# Patient Record
Sex: FEMALE | Race: WHITE | Employment: OTHER | URBAN - NONMETROPOLITAN AREA
[De-identification: names, ages, dates, MRNs, and addresses within clinical notes are randomized per-mention and may not be internally consistent; named-entity substitution may affect disease eponyms.]

---

## 2016-05-02 LAB — MAMMOGRAPHY, EXTERNAL: NORMAL

## 2018-04-11 PROBLEM — K50.919 CROHN'S DISEASE WITH COMPLICATION (HCC): Status: ACTIVE | Noted: 2018-02-15

## 2018-09-17 PROBLEM — F41.9 ANXIETY: Status: ACTIVE | Noted: 2018-09-17

## 2019-03-05 PROBLEM — E11.9 TYPE 2 DIABETES MELLITUS WITHOUT COMPLICATION, WITHOUT LONG-TERM CURRENT USE OF INSULIN (HCC): Status: ACTIVE | Noted: 2019-03-05

## 2019-03-05 PROBLEM — K58.2 IRRITABLE BOWEL SYNDROME WITH BOTH CONSTIPATION AND DIARRHEA: Status: ACTIVE | Noted: 2019-03-05

## 2020-05-05 PROBLEM — B00.9 HSV-2 (HERPES SIMPLEX VIRUS 2) INFECTION: Status: ACTIVE | Noted: 2020-05-05

## 2022-10-13 ENCOUNTER — TELEPHONE (OUTPATIENT)
Dept: FAMILY MEDICINE CLINIC | Age: 52
End: 2022-10-13

## 2022-10-13 NOTE — TELEPHONE ENCOUNTER
Patient is wondering about the Semaglutide/Ozempic injection she was prescribed. Patient states that she received a message from the pharmacy that it needed to have a prior authorization done. Patient is wondering if someone is working on this or where we are with the prior authorization.

## 2022-10-13 NOTE — TELEPHONE ENCOUNTER
We did not receive a PA on this medication[facility]

## 2022-10-25 ENCOUNTER — HOSPITAL ENCOUNTER (INPATIENT)
Age: 52
LOS: 3 days | Discharge: HOME | DRG: 386 | End: 2022-10-28
Attending: EMERGENCY MEDICINE | Admitting: SURGERY
Payer: COMMERCIAL

## 2022-10-25 ENCOUNTER — APPOINTMENT (OUTPATIENT)
Dept: GENERAL RADIOLOGY | Age: 52
DRG: 386 | End: 2022-10-25
Payer: COMMERCIAL

## 2022-10-25 ENCOUNTER — APPOINTMENT (OUTPATIENT)
Dept: CT IMAGING | Age: 52
DRG: 386 | End: 2022-10-25
Payer: COMMERCIAL

## 2022-10-25 DIAGNOSIS — K56.609 SMALL BOWEL OBSTRUCTION (HCC): Primary | ICD-10-CM

## 2022-10-25 LAB
ALBUMIN SERPL-MCNC: 3.9 GM/DL (ref 3.4–5)
ALP BLD-CCNC: 134 U/L (ref 46–116)
ALT SERPL-CCNC: 32 U/L (ref 14–63)
ANION GAP: 12 MEQ/L (ref 8–16)
AST SERPL-CCNC: 11 U/L (ref 15–37)
BASOPHILS # BLD: 0.1 % (ref 0–3)
BASOPHILS ABSOLUTE: 0 THOU/MM3 (ref 0–0.1)
BILIRUB SERPL-MCNC: 0.6 MG/DL (ref 0.2–1)
BILIRUBIN URINE: NEGATIVE
BLOOD, URINE: NEGATIVE
BUN BLDV-MCNC: 17 MG/DL (ref 7–18)
CHARACTER, URINE: CLEAR
CHLORIDE BLD-SCNC: 98 MEQ/L (ref 98–107)
CO2: 26 MEQ/L (ref 21–32)
COLOR: YELLOW
CREAT SERPL-MCNC: 1.1 MG/DL (ref 0.6–1.3)
EOSINOPHILS ABSOLUTE: 0.1 THOU/MM3 (ref 0–0.5)
EOSINOPHILS RELATIVE PERCENT: 0.4 % (ref 0–4)
GFR, ESTIMATED: > 60 ML/MIN/1.73M2
GLUCOSE BLD-MCNC: 149 MG/DL (ref 70–108)
GLUCOSE BLD-MCNC: 187 MG/DL (ref 70–108)
GLUCOSE BLD-MCNC: 267 MG/DL (ref 74–106)
GLUCOSE URINE: 500 MG/DL
HCT VFR BLD CALC: 45.6 % (ref 37–47)
HEMOGLOBIN: 15.2 GM/DL (ref 12–16)
IMMATURE GRANS (ABS): 0.05 THOU/MM3 (ref 0–0.07)
IMMATURE GRANULOCYTES: 0 %
KETONES, URINE: NEGATIVE
LACTATE: 1.6 MMOL/L (ref 0.9–1.7)
LEUKOCYTE ESTERASE, URINE: NEGATIVE
LIPASE: 123 U/L (ref 73–393)
LYMPHOCYTES # BLD: 8.2 % (ref 15–47)
LYMPHOCYTES ABSOLUTE: 1.5 THOU/MM3 (ref 1–4.8)
MCH RBC QN AUTO: 28 PG (ref 26–32)
MCHC RBC AUTO-ENTMCNC: 33.3 GM/DL (ref 31–35)
MCV RBC AUTO: 84.1 FL (ref 81–99)
MONOCYTES: 0.8 THOU/MM3 (ref 0.3–1.3)
MONOCYTES: 4.1 % (ref 0–12)
NITRITE, URINE: NEGATIVE
PDW BLD-RTO: 12.7 % (ref 11.5–14.9)
PH UA: 5 (ref 5–9)
PLATELET # BLD: 358 THOU/MM3 (ref 130–400)
PMV BLD AUTO: 9.5 FL (ref 9.4–12.4)
POC CALCIUM: 10 MG/DL (ref 8.5–10.1)
POTASSIUM SERPL-SCNC: 4.4 MEQ/L (ref 3.5–5.1)
PROTEIN UA: NEGATIVE
RBC # BLD: 5.42 MILL/MM3 (ref 4.1–5.3)
SEG NEUTROPHILS: 86.9 % (ref 43–75)
SEGMENTED NEUTROPHILS ABSOLUTE COUNT: 16.3 THOU/MM3 (ref 1.8–7.7)
SODIUM BLD-SCNC: 136 MEQ/L (ref 136–145)
SPECIFIC GRAVITY, URINE: > 1.03 (ref 1–1.03)
TOTAL PROTEIN: 6.8 G/DL (ref 6.1–8)
UROBILINOGEN, URINE: 0.2 EU/DL (ref 0–1)
WBC # BLD: 18.8 THOU/MM3 (ref 4.8–10.8)

## 2022-10-25 PROCEDURE — 74177 CT ABD & PELVIS W/CONTRAST: CPT

## 2022-10-25 PROCEDURE — 82247 BILIRUBIN TOTAL: CPT

## 2022-10-25 PROCEDURE — 81003 URINALYSIS AUTO W/O SCOPE: CPT

## 2022-10-25 PROCEDURE — 6360000002 HC RX W HCPCS: Performed by: EMERGENCY MEDICINE

## 2022-10-25 PROCEDURE — 99222 1ST HOSP IP/OBS MODERATE 55: CPT | Performed by: SURGERY

## 2022-10-25 PROCEDURE — 96375 TX/PRO/DX INJ NEW DRUG ADDON: CPT

## 2022-10-25 PROCEDURE — 6370000000 HC RX 637 (ALT 250 FOR IP): Performed by: SURGERY

## 2022-10-25 PROCEDURE — 84450 TRANSFERASE (AST) (SGOT): CPT

## 2022-10-25 PROCEDURE — 2580000003 HC RX 258: Performed by: EMERGENCY MEDICINE

## 2022-10-25 PROCEDURE — 74018 RADEX ABDOMEN 1 VIEW: CPT

## 2022-10-25 PROCEDURE — 84155 ASSAY OF PROTEIN SERUM: CPT

## 2022-10-25 PROCEDURE — 82565 ASSAY OF CREATININE: CPT

## 2022-10-25 PROCEDURE — 96374 THER/PROPH/DIAG INJ IV PUSH: CPT

## 2022-10-25 PROCEDURE — 82948 REAGENT STRIP/BLOOD GLUCOSE: CPT

## 2022-10-25 PROCEDURE — 6360000004 HC RX CONTRAST MEDICATION: Performed by: EMERGENCY MEDICINE

## 2022-10-25 PROCEDURE — 2580000003 HC RX 258: Performed by: SURGERY

## 2022-10-25 PROCEDURE — 82435 ASSAY OF BLOOD CHLORIDE: CPT

## 2022-10-25 PROCEDURE — 84295 ASSAY OF SERUM SODIUM: CPT

## 2022-10-25 PROCEDURE — 84132 ASSAY OF SERUM POTASSIUM: CPT

## 2022-10-25 PROCEDURE — 82374 ASSAY BLOOD CARBON DIOXIDE: CPT

## 2022-10-25 PROCEDURE — 83605 ASSAY OF LACTIC ACID: CPT

## 2022-10-25 PROCEDURE — 1200000000 HC SEMI PRIVATE

## 2022-10-25 PROCEDURE — 84460 ALANINE AMINO (ALT) (SGPT): CPT

## 2022-10-25 PROCEDURE — 82947 ASSAY GLUCOSE BLOOD QUANT: CPT

## 2022-10-25 PROCEDURE — 84520 ASSAY OF UREA NITROGEN: CPT

## 2022-10-25 PROCEDURE — 84075 ASSAY ALKALINE PHOSPHATASE: CPT

## 2022-10-25 PROCEDURE — 83690 ASSAY OF LIPASE: CPT

## 2022-10-25 PROCEDURE — 6360000002 HC RX W HCPCS: Performed by: SURGERY

## 2022-10-25 PROCEDURE — 85025 COMPLETE CBC W/AUTO DIFF WBC: CPT

## 2022-10-25 PROCEDURE — 82310 ASSAY OF CALCIUM: CPT

## 2022-10-25 PROCEDURE — 99285 EMERGENCY DEPT VISIT HI MDM: CPT

## 2022-10-25 PROCEDURE — 82040 ASSAY OF SERUM ALBUMIN: CPT

## 2022-10-25 PROCEDURE — 0D9670Z DRAINAGE OF STOMACH WITH DRAINAGE DEVICE, VIA NATURAL OR ARTIFICIAL OPENING: ICD-10-PCS | Performed by: EMERGENCY MEDICINE

## 2022-10-25 RX ORDER — METOPROLOL TARTRATE 50 MG/1
50 TABLET, FILM COATED ORAL 2 TIMES DAILY
Status: DISCONTINUED | OUTPATIENT
Start: 2022-10-25 | End: 2022-10-28 | Stop reason: HOSPADM

## 2022-10-25 RX ORDER — INSULIN LISPRO 100 [IU]/ML
0-8 INJECTION, SOLUTION INTRAVENOUS; SUBCUTANEOUS EVERY 4 HOURS
Status: DISCONTINUED | OUTPATIENT
Start: 2022-10-25 | End: 2022-10-26

## 2022-10-25 RX ORDER — LOSARTAN POTASSIUM 100 MG/1
100 TABLET ORAL DAILY
Status: DISCONTINUED | OUTPATIENT
Start: 2022-10-25 | End: 2022-10-28 | Stop reason: HOSPADM

## 2022-10-25 RX ORDER — SODIUM CHLORIDE 9 MG/ML
INJECTION, SOLUTION INTRAVENOUS CONTINUOUS
Status: DISCONTINUED | OUTPATIENT
Start: 2022-10-25 | End: 2022-10-27

## 2022-10-25 RX ORDER — TRAZODONE HYDROCHLORIDE 100 MG/1
100 TABLET ORAL NIGHTLY PRN
Status: DISCONTINUED | OUTPATIENT
Start: 2022-10-25 | End: 2022-10-28 | Stop reason: HOSPADM

## 2022-10-25 RX ORDER — SODIUM CHLORIDE 0.9 % (FLUSH) 0.9 %
5-40 SYRINGE (ML) INJECTION PRN
Status: DISCONTINUED | OUTPATIENT
Start: 2022-10-25 | End: 2022-10-28 | Stop reason: HOSPADM

## 2022-10-25 RX ORDER — AMLODIPINE BESYLATE 10 MG/1
10 TABLET ORAL DAILY
Status: DISCONTINUED | OUTPATIENT
Start: 2022-10-25 | End: 2022-10-28 | Stop reason: HOSPADM

## 2022-10-25 RX ORDER — SODIUM CHLORIDE 9 MG/ML
INJECTION, SOLUTION INTRAVENOUS PRN
Status: DISCONTINUED | OUTPATIENT
Start: 2022-10-25 | End: 2022-10-28 | Stop reason: HOSPADM

## 2022-10-25 RX ORDER — ONDANSETRON 2 MG/ML
4 INJECTION INTRAMUSCULAR; INTRAVENOUS ONCE
Status: COMPLETED | OUTPATIENT
Start: 2022-10-25 | End: 2022-10-25

## 2022-10-25 RX ORDER — ACETAMINOPHEN 325 MG/1
650 TABLET ORAL EVERY 6 HOURS PRN
Status: DISCONTINUED | OUTPATIENT
Start: 2022-10-25 | End: 2022-10-28 | Stop reason: HOSPADM

## 2022-10-25 RX ORDER — DEXTROSE MONOHYDRATE 100 MG/ML
INJECTION, SOLUTION INTRAVENOUS CONTINUOUS PRN
Status: DISCONTINUED | OUTPATIENT
Start: 2022-10-25 | End: 2022-10-28 | Stop reason: HOSPADM

## 2022-10-25 RX ORDER — SODIUM CHLORIDE 0.9 % (FLUSH) 0.9 %
5-40 SYRINGE (ML) INJECTION EVERY 12 HOURS SCHEDULED
Status: DISCONTINUED | OUTPATIENT
Start: 2022-10-25 | End: 2022-10-28 | Stop reason: HOSPADM

## 2022-10-25 RX ORDER — ENOXAPARIN SODIUM 100 MG/ML
40 INJECTION SUBCUTANEOUS EVERY 24 HOURS
Status: DISCONTINUED | OUTPATIENT
Start: 2022-10-25 | End: 2022-10-28 | Stop reason: HOSPADM

## 2022-10-25 RX ORDER — 0.9 % SODIUM CHLORIDE 0.9 %
1000 INTRAVENOUS SOLUTION INTRAVENOUS ONCE
Status: COMPLETED | OUTPATIENT
Start: 2022-10-25 | End: 2022-10-25

## 2022-10-25 RX ORDER — ONDANSETRON 2 MG/ML
4 INJECTION INTRAMUSCULAR; INTRAVENOUS EVERY 6 HOURS PRN
Status: DISCONTINUED | OUTPATIENT
Start: 2022-10-25 | End: 2022-10-28 | Stop reason: HOSPADM

## 2022-10-25 RX ADMIN — ACETAMINOPHEN 650 MG: 325 TABLET ORAL at 23:52

## 2022-10-25 RX ADMIN — TRAZODONE HYDROCHLORIDE 100 MG: 100 TABLET ORAL at 23:51

## 2022-10-25 RX ADMIN — HYDROMORPHONE HYDROCHLORIDE 0.5 MG: 1 INJECTION, SOLUTION INTRAMUSCULAR; INTRAVENOUS; SUBCUTANEOUS at 14:12

## 2022-10-25 RX ADMIN — HYDROMORPHONE HYDROCHLORIDE 0.5 MG: 1 INJECTION, SOLUTION INTRAMUSCULAR; INTRAVENOUS; SUBCUTANEOUS at 10:20

## 2022-10-25 RX ADMIN — ONDANSETRON 4 MG: 2 INJECTION INTRAMUSCULAR; INTRAVENOUS at 10:21

## 2022-10-25 RX ADMIN — ONDANSETRON 4 MG: 2 INJECTION INTRAMUSCULAR; INTRAVENOUS at 17:01

## 2022-10-25 RX ADMIN — SODIUM CHLORIDE 1000 ML: 9 INJECTION, SOLUTION INTRAVENOUS at 10:02

## 2022-10-25 RX ADMIN — HYDROMORPHONE HYDROCHLORIDE 1 MG: 1 INJECTION, SOLUTION INTRAMUSCULAR; INTRAVENOUS; SUBCUTANEOUS at 19:21

## 2022-10-25 RX ADMIN — SODIUM CHLORIDE 1000 ML: 9 INJECTION, SOLUTION INTRAVENOUS at 12:21

## 2022-10-25 RX ADMIN — METOPROLOL TARTRATE 50 MG: 50 TABLET, FILM COATED ORAL at 19:29

## 2022-10-25 RX ADMIN — SODIUM CHLORIDE: 9 INJECTION, SOLUTION INTRAVENOUS at 18:26

## 2022-10-25 RX ADMIN — IOPAMIDOL 100 ML: 755 INJECTION, SOLUTION INTRAVENOUS at 10:39

## 2022-10-25 RX ADMIN — ENOXAPARIN SODIUM 40 MG: 100 INJECTION SUBCUTANEOUS at 21:16

## 2022-10-25 ASSESSMENT — PAIN - FUNCTIONAL ASSESSMENT
PAIN_FUNCTIONAL_ASSESSMENT: ACTIVITIES ARE NOT PREVENTED
PAIN_FUNCTIONAL_ASSESSMENT: ACTIVITIES ARE NOT PREVENTED

## 2022-10-25 ASSESSMENT — ENCOUNTER SYMPTOMS
SORE THROAT: 0
VOMITING: 1
NAUSEA: 1
ABDOMINAL PAIN: 1
EYE PAIN: 0
SHORTNESS OF BREATH: 0
DIARRHEA: 0
WHEEZING: 0
BLOOD IN STOOL: 0
EYE DISCHARGE: 0

## 2022-10-25 ASSESSMENT — PAIN SCALES - GENERAL
PAINLEVEL_OUTOF10: 8
PAINLEVEL_OUTOF10: 3
PAINLEVEL_OUTOF10: 6
PAINLEVEL_OUTOF10: 7
PAINLEVEL_OUTOF10: 5
PAINLEVEL_OUTOF10: 10

## 2022-10-25 ASSESSMENT — PAIN DESCRIPTION - LOCATION
LOCATION: ABDOMEN
LOCATION: ABDOMEN;HEAD
LOCATION: ABDOMEN

## 2022-10-25 ASSESSMENT — PAIN DESCRIPTION - DESCRIPTORS
DESCRIPTORS: PRESSURE
DESCRIPTORS: PRESSURE

## 2022-10-25 ASSESSMENT — PAIN DESCRIPTION - PAIN TYPE: TYPE: ACUTE PAIN

## 2022-10-25 ASSESSMENT — PAIN DESCRIPTION - ORIENTATION
ORIENTATION: MID;LEFT
ORIENTATION: MID;LEFT

## 2022-10-25 NOTE — ED PROVIDER NOTES
[address]  [address]  Phone: [phone]    eMERGENCY dEPARTMENT eNCOUnter           [address] [facility]       Chief Complaint   Patient presents with    Abdominal Pain    Emesis    Fever    Nausea       Nurses Notes reviewed and I agree except as noted in the HPI. HISTORY OF PRESENT ILLNESS    Leighton Odom is a 47 y.o. female who presented via private vehicle with above-mentioned complaints. Symptoms started 2 days ago. She describes her pain as severe, dull, constant left upper quadrant and epigastric pain which did not radiate anywhere and was not severe or exacerbated by anything. She stated that she vomited twice, her emesis smelled like feces. She had 1 normal bowel movement. She has subjective fever but did not check her temperature. She has history of Crohn's disease but never had pain like that before. She had appendectomy and cholecystectomy as well. Denies recent alcohol consumption. REVIEW OF SYSTEMS     Review of Systems   Constitutional:  Negative for chills and fever. HENT:  Negative for sore throat. Eyes:  Negative for pain and discharge. Respiratory:  Negative for shortness of breath and wheezing. Cardiovascular:  Negative for chest pain and palpitations. Gastrointestinal:  Positive for abdominal pain, nausea and vomiting. Negative for blood in stool and diarrhea. Genitourinary:  Negative for dysuria and hematuria. Musculoskeletal:  Negative for neck pain and neck stiffness. Neurological:  Negative for seizures, syncope and headaches. Psychiatric/Behavioral:  Negative for confusion. PAST MEDICAL HISTORY    has a past medical history of Anxiety, Crohn's disease ([address] Depression, Diverticulosis, Histoplasmosis, Hypertension, Irritable bowel syndrome with both constipation and diarrhea, and Type 2 diabetes mellitus without complication, without long-term current use of insulin ([address]     SURGICAL HISTORY has a past surgical history that includes Carpal tunnel release (Right);  section; Breast reduction surgery; Foot surgery (Left); Appendectomy; Cholecystectomy; and HYSTERECTOMY VAGINAL (). CURRENT MEDICATIONS       Previous Medications    ALBUTEROL SULFATE HFA (PROVENTIL HFA) 108 (90 BASE) MCG/ACT INHALER    Inhale 2 puffs into the lungs every 6 hours as needed for Wheezing    AMLODIPINE (NORVASC) 10 MG TABLET    Take 1 tablet by mouth daily    BLOOD GLUCOSE MONITORING SUPPL ([employer] BLOOD GLUCOSE STARTER) W/DEVICE KIT    Please give whatever brand device and strips covered by insurance    LOSARTAN (COZAAR) 100 MG TABLET    Take 1 tablet by mouth daily    METFORMIN (GLUCOPHAGE-XR) 500 MG EXTENDED RELEASE TABLET    Take 2 tablets by mouth daily (with breakfast)    METOPROLOL TARTRATE (LOPRESSOR) 50 MG TABLET    Take 1 tablet by mouth 2 times daily    ONDANSETRON (ZOFRAN ODT) 4 MG DISINTEGRATING TABLET    Take 1 tablet by mouth every 8 hours as needed for Nausea or Vomiting    TRAZODONE (DESYREL) 100 MG TABLET    Take 1 tablet by mouth nightly as needed for Sleep    VENLAFAXINE (EFFEXOR XR) 37.5 MG EXTENDED RELEASE CAPSULE    Take 3 capsules by mouth daily       ALLERGIES     has No Known Allergies. FAMILY HISTORY     She indicated that her mother is alive. She indicated that her father is . She indicated that the status of her sister is unknown. She indicated that her maternal grandmother is . She indicated that her maternal grandfather is . She indicated that her paternal grandmother is . She indicated that her paternal grandfather is . family history includes Colon Cancer in her maternal grandfather; Early Death in her father; High Blood Pressure in her mother and sister; Other in her mother. SOCIAL HISTORY      reports that she has never smoked.  She has never used smokeless tobacco. She reports that she does not drink alcohol and does not use drugs.    PHYSICAL EXAM     INITIAL VITALS:  height is 5' 6\" (1.676 m) and weight is 194 lb (88 kg). Her temporal temperature is 96.6 °F (35.9 °C) (abnormal). Her blood pressure is 127/79 and her pulse is 92. Her respiration is 16 and oxygen saturation is 93%. Physical Exam  Vitals and nursing note reviewed. Constitutional:       General: She is in acute distress. Appearance: She is well-developed. Comments: Patient is awake and alert, she looks mildly ill but nontoxic   HENT:      Head: Normocephalic and atraumatic. Right Ear: Tympanic membrane normal. No drainage. Left Ear: Tympanic membrane normal. No drainage. Nose: No rhinorrhea. Mouth/Throat:      Pharynx: No oropharyngeal exudate. Eyes:      General: No scleral icterus. Conjunctiva/sclera: Conjunctivae normal.      Pupils: Pupils are equal, round, and reactive to light. Neck:      Thyroid: No thyromegaly. Cardiovascular:      Rate and Rhythm: Normal rate and regular rhythm. Heart sounds: Normal heart sounds. No murmur heard. Pulmonary:      Effort: Pulmonary effort is normal. No respiratory distress. Breath sounds: Normal breath sounds. No stridor. Abdominal:      General: Bowel sounds are normal. There is no distension. Palpations: Abdomen is soft. There is no mass. Tenderness: There is abdominal tenderness. There is guarding. There is no rebound. Comments: There is mild tenderness to palpation of the left upper quadrant and epigastric area with mild guarding. Musculoskeletal:         General: No tenderness. Right shoulder: No swelling or deformity. Cervical back: Normal range of motion and neck supple. Lymphadenopathy:      Cervical: No cervical adenopathy. Skin:     General: Skin is warm and dry. Findings: No rash. Nails: There is no clubbing. Neurological:      Mental Status: She is alert.          DIFFERENTIAL DIAGNOSIS:       DIAGNOSTIC RESULTS RADIOLOGY:   Abdomen and pelvis CT scan with IV contrast showed small bowel obstruction. LABS:   Labs Reviewed   CBC WITH AUTO DIFFERENTIAL - Abnormal; Notable for the following components:       Result Value    WBC 18.8 (*)     RBC 5.42 (*)     Seg Neutrophils 86.9 (*)     Segs Absolute 16.3 (*)     Lymphocytes 8.2 (*)     All other components within normal limits   COMPREHENSIVE METABOLIC PANEL - Abnormal; Notable for the following components:    Glucose 267 (*)     AST 11 (*)     Alkaline Phosphatase 134 (*)     All other components within normal limits   LIPASE   LACTIC ACID   GLOMERULAR FILTRATION RATE, ESTIMATED   ANION GAP   URINALYSIS WITH REFLEX TO CULTURE   PROTEIN, TOTAL    Narrative:     [address] [id number]   Laboratory studies showed leukocytosis    EMERGENCY DEPARTMENT COURSE:   Vitals:    Vitals:    10/25/22 1120 10/25/22 1135 10/25/22 1150 10/25/22 1205   BP: 124/81 127/84 125/87 127/79   Pulse:       Resp:       Temp:       TempSrc:       SpO2: 94% 93%     Weight:       Height:         MDM:  Patient presented with 2-day history of abdominal pain and vomiting. Her blood work showed leukocytosis. CT scan of abdomen pelvis with IV contrast which was reviewed by me showed small bowel obstruction. NG tube was inserted and suctioned about 150 mL of bile. Patient received IV normal saline, Zofran and Dilaudid. KUB confirmed proper placement of NG tube. Reevaluation at 12:10 PM:  She is awake and alert, she seemed slightly more comfortable, she reported improvement. She remained medically stable. Consult:  I spoke with surgeon on-call Dr. Alex Moffett who accept the patient. FINAL IMPRESSION      1.  Small bowel obstruction (HCC)          DISPOSITION/PLAN   Admitted, condition is fair      (Please note that portions of this note were completed with a voice recognition program.  Efforts were made to edit the dictations but occasionally words are mis-transcribed.)    Geneva Hanson MD            Ifeanyi Yamilex Ledbetter MD  10/25/22 3942

## 2022-10-25 NOTE — ED NOTES
EMS notified  of transport to [facility].       Duarte Foster RN  10/25/22 4837

## 2022-10-25 NOTE — ED TRIAGE NOTES
Pt. Presents ambulatory to ED with c/o vomiting, abdominal pain since Sunday; c/o fever and chills last night.

## 2022-10-25 NOTE — PROGRESS NOTES
Patient arrived to [facility] by squad, states pain is 6/10 and tolerable. Slight nausea. Voided 300 ml of yellow urine, sample obtained. 0.9NS infusing into RAC. Oriented to room , call light within reach.

## 2022-10-25 NOTE — ED NOTES
Pt denies any nausea, NG to Low continous suction, approx. . 200 ml. In suction canister.       Abel Pena, RN  10/25/22 1200

## 2022-10-25 NOTE — ED NOTES
Returned from CT scanning, continues to c/o nausea. Unable to urinate at this time.       Ivonne Jackson RN  10/25/22 8088

## 2022-10-25 NOTE — H&P
General Surgery History and Physical  Yamilex Francis DO    Pt Name: Madison Adamson  MRN: [id number]  YOB: 1970  Date of evaluation: 10/25/2022  Primary Care Physician: MAXWELL Velazquez - CNP  Patient evaluated at the request of ED physician  Reason for evaluation: SBO  IMPRESSIONS:   SBO either related to adhesions or Crohn's stricture  Crohn's disease - not currently on therapy  Hx HTN  Type 2 diabetes   does not have any pertinent problems on file. PLANS:   Admit type: Inpatient  It is expected this patient's LOS will be: Greater than 2 midnights  Anticipated Disposition Upon Discharge: Home  Analgesics and antiemetics on a prn basis  IV hydration  NG to LIS  Ice chips and sips of H20  CRP  Am labs and films   Home Medications as ordered  Further recommendations to follow  SUBJECTIVE:   History of Chief Complaint:    Edwardo Diaz is a 47 y. o.female who presents with abdominal pain. The pain is described as cramping and sharp, and is moderate to severe in intensity. Pain is located in the LUQ without radiation. Onset was 2 days ago. Symptoms have been gradually worsening since. Aggravating factors include eating. Alleviating factors include NG decompression. Associated symptoms include nausea and vomiting. She denies constipation, diarrhea, hematochezia, and melena. She admits to history of Crohn's disease diagnosed in childhood but is not any current therapy. She denies history of hepatitis, pancreatitis, jaundice, colitis, and ulcer disease. Past Medical History   has a past medical history of Anxiety, Crohn's disease ([address] Depression, Diverticulosis, Histoplasmosis, Hypertension, Irritable bowel syndrome with both constipation and diarrhea, and Type 2 diabetes mellitus without complication, without long-term current use of insulin ([address] Past Surgical History   has a past surgical history that includes Carpal tunnel release (Right);  section; Breast reduction surgery;  Foot surgery (Bilateral); Appendectomy; Cholecystectomy; and HYSTERECTOMY VAGINAL (2004). Medications  Prior to Admission medications    Medication Sig Start Date End Date Taking? Authorizing Provider   metoprolol tartrate (LOPRESSOR) 50 MG tablet Take 1 tablet by mouth 2 times daily 9/21/22 12/20/22  MAXWELL Velazquez CNP   venlafaxine (EFFEXOR XR) 37.5 MG extended release capsule Take 3 capsules by mouth daily 9/21/22 12/20/22  MAXWELL Velazquez CNP   traZODone (DESYREL) 100 MG tablet Take 1 tablet by mouth nightly as needed for Sleep 9/21/22   MAXWELL Velazquez CNP   albuterol sulfate HFA (PROVENTIL HFA) 108 (90 Base) MCG/ACT inhaler Inhale 2 puffs into the lungs every 6 hours as needed for Wheezing 8/4/22   MAXWELL Velazquez CNP   ondansetron (ZOFRAN ODT) 4 MG disintegrating tablet Take 1 tablet by mouth every 8 hours as needed for Nausea or Vomiting 7/21/22   MAXWELL Velazquez CNP   metFORMIN (GLUCOPHAGE-XR) 500 MG extended release tablet Take 2 tablets by mouth daily (with breakfast)  Patient taking differently: Take 1,500 mg by mouth daily (with breakfast) 11/23/21 9/21/22  MAXWELL Velazquez CNP   amLODIPine (NORVASC) 10 MG tablet Take 1 tablet by mouth daily 7/14/21 10/25/22  MAXWELL Velazquez CNP   losartan (COZAAR) 100 MG tablet Take 1 tablet by mouth daily 7/14/21 10/25/22  MAXWELL Velazquez CNP   Blood Glucose Monitoring Suppl [facility] BLOOD GLUCOSE STARTER) w/Device KIT Please give whatever brand device and strips covered by insurance 4/11/18   MAXWELL Velazquez CNP    Scheduled Meds:  Continuous Infusions:  PRN Meds:. Allergies  has No Known Allergies. Family History  family history includes Colon Cancer in her maternal grandfather; Early Death in her father; High Blood Pressure in her mother and sister; Other in her mother. Social History   reports that she has never smoked.  She has never used smokeless tobacco. She reports that she does not drink alcohol and does not use drugs. Review of Systems:  Unless otherwise stated in HPI. ROS unless stated previously has been otherwise reviewed and are negative. OBJECTIVE:   CURRENT VITALS:  height is 5' 6\" (1.676 m) and weight is 198 lb (89.8 kg). Her oral temperature is 97.9 °F (36.6 °C). Her blood pressure is 139/89 and her pulse is 108 (abnormal). Her respiration is 18 and oxygen saturation is 93%. Body mass index is 31.96 kg/m². Temperature Range (24h):Temp: 97.9 °F (36.6 °C) Temp  Av.3 °F (36.3 °C)  Min: 96.6 °F (35.9 °C)  Max: 97.9 °F (36.6 °C)  BP Range (12X): Systolic (21UDD), BPL:651 , Min:117 , OLP:747     Diastolic (29BEZ), BDD:09, Min:76, Max:89    Pulse Range (24h): Pulse  Av.7  Min: 92  Max: 111  Respiration Range (24h): Resp  Av.3  Min: 16  Max: 18  Current Pulse Ox (24h):  SpO2: 93 %  Pulse Ox Range (24h):  SpO2  Av.7 %  Min: 90 %  Max: 96 %  Oxygen Amount and Delivery:    CONSTITUTIONAL: Alert and oriented times 3, no acute distress and cooperative to examination with proper mood and affect. SKIN: Skin color, texture, turgor normal. No rashes or lesions. LYMPH: no cervical nodes, no inguinal nodes  HEENT: Head is normocephalic, atraumatic. EOMI, PERRLA. NECK: Supple, symmetrical, trachea midline, no adenopathy, thyroid symmetric, not enlarged and no tenderness, skin normal.  CHEST/LUNGS: chest symmetric with normal A/P diameter, normal respiratory rate and rhythm, lungs clear to auscultation without wheezes, rales or rhonchi. No accessory muscle use. Scars None   CARDIOVASCULAR: Heart sounds are normal.  Regular rate and rhythm without murmur, gallop or rub. Normal S1 and S2. Carotid and femoral pulses 2+/4 and equal bilaterally. ABDOMEN: distended surgical scar(s) present. Abnormal bowel sounds: high pitched bowel sounds. Soft, no masses palpable. no evidence of hernia. Percussion: Normal without hepatosplenomegally. Tenderness: LUQ.   RECTAL: deferred, not clinically indicated  NEUROLOGIC: There are no focalizing motor or sensory deficits. CN II-XII are grossly intact. Juan Parisian EXTREMITIES: no cyanosis, no clubbing, and no edema. LABS:     Recent Labs     10/25/22  1007   WBC 18.8*   HGB 15.2   HCT 45.6         K 4.4   CL 98   CO2 26   BUN 17   CREATININE 1.1   AST 11*   ALT 32   BILITOT 0.6   LIPASE 123.0     RADIOLOGY:   I have personally reviewed the following films:  XR ABDOMEN (KUB) (SINGLE AP VIEW)   Final Result   The nasogastric catheter extends below the hemidiaphragms with tip in the left upper quadrant in the projection of the stomach. Multiple dilated small bowel loops with little distal bowel gas again noted consistent with small bowel obstruction. **This report has been created using voice recognition software. It may contain minor errors which are inherent in voice recognition technology. **      Final report electronically signed by Dr Ja Tucker on 10/25/2022 12:25 PM      CT ABDOMEN PELVIS W IV CONTRAST Additional Contrast? None   Final Result   1. Multiple dilated small bowel loops with bowel wall enhancement and thickening transition left lower quadrant. Suspicious for small bowel obstruction with generalized enteritis. **This report has been created using voice recognition software. It may contain minor errors which are inherent in voice recognition technology. **      Final report electronically signed by Dr. Kelly Tse on 10/25/2022 10:50 AM          Thank you for the interesting evaluation. Further recommendations to follow.     Electronically signed by Ebenezer Calloway DO on 10/25/2022 at 4:05 PM

## 2022-10-25 NOTE — ED NOTES
#16 Fr. Jacobo sump inserted rt. Nares, pt. Tolerated well; placement verified with air insertion and auscultation over epigastric area, NG secured and placed on low suctiion, returned approx. 100 ml. Yellow liquid. Pt. Voices looks like emesis she had at home.       Joslyn Avelar RN  10/25/22 2724

## 2022-10-25 NOTE — ED NOTES
Pt. Returned to exam 1 and suction hooked up. Dr. Angela Whitley returned page and speaking to Dr. Mina Ferrell.       Mara Galicia RN  10/25/22 7792

## 2022-10-25 NOTE — ED NOTES
Pt. In xray, NG pulled back 5 cm. Per order of Dr Chanda Crigler.      Parul Garcia RN  10/25/22 6427

## 2022-10-25 NOTE — Clinical Note
Hero Campbell was seen and treated in our emergency department on 10/25/2022. She may return to school on 10/26/2022. If you have any questions or concerns, please don't hesitate to call.       Codie Rich MD

## 2022-10-25 NOTE — ED NOTES
Pt presents for B12 inj.   Pt tolerated well.  See FAM / MAR   Report and paperwork given to [facility]. EMS. Pt. To be transported to [facility] in stable condition for direct admit to [address]. NG tube clamped at this time. IV infusion 0.9 NS @ 250 ml/hr. Pulse regular. Extremities warm. Respirations regular and quiet. Mucous membranes pink & moist. Alert and oriented times 3. No nausea or vomiting. Range of motion within patient's limits. Skin pink, warm and dry. Calm and cooperative.       Mara Galicia RN  10/25/22 9273

## 2022-10-25 NOTE — ED NOTES
Pt. Laying supine on cot, c/o nausea and abdominal pain. Unable to urinate at this time.       Kimmy Olivas RN  10/25/22 1007

## 2022-10-25 NOTE — PROGRESS NOTES
Urine sent to lab for urinalysis with reflex culture as ordered from [facility]. This is first urine patient was able to produce today.

## 2022-10-26 ENCOUNTER — APPOINTMENT (OUTPATIENT)
Dept: GENERAL RADIOLOGY | Age: 52
DRG: 386 | End: 2022-10-26
Payer: COMMERCIAL

## 2022-10-26 LAB
ANION GAP SERPL CALCULATED.3IONS-SCNC: 12 MEQ/L (ref 8–16)
BASOPHILS # BLD: 0.3 %
BASOPHILS ABSOLUTE: 0 THOU/MM3 (ref 0–0.1)
BUN BLDV-MCNC: 15 MG/DL (ref 7–22)
C-REACTIVE PROTEIN: 2.72 MG/DL (ref 0–1)
CALCIUM SERPL-MCNC: 9.2 MG/DL (ref 8.5–10.5)
CHLORIDE BLD-SCNC: 103 MEQ/L (ref 98–111)
CO2: 25 MEQ/L (ref 23–33)
CREAT SERPL-MCNC: 0.8 MG/DL (ref 0.4–1.2)
EOSINOPHIL # BLD: 3.8 %
EOSINOPHILS ABSOLUTE: 0.4 THOU/MM3 (ref 0–0.4)
ERYTHROCYTE [DISTWIDTH] IN BLOOD BY AUTOMATED COUNT: 13 % (ref 11.5–14.5)
ERYTHROCYTE [DISTWIDTH] IN BLOOD BY AUTOMATED COUNT: 42.3 FL (ref 35–45)
GFR SERPL CREATININE-BSD FRML MDRD: > 60 ML/MIN/1.73M2
GLUCOSE BLD-MCNC: 120 MG/DL (ref 70–108)
GLUCOSE BLD-MCNC: 135 MG/DL (ref 70–108)
GLUCOSE BLD-MCNC: 136 MG/DL (ref 70–108)
GLUCOSE BLD-MCNC: 148 MG/DL (ref 70–108)
GLUCOSE BLD-MCNC: 150 MG/DL (ref 70–108)
GLUCOSE BLD-MCNC: 151 MG/DL (ref 70–108)
HCT VFR BLD CALC: 40.1 % (ref 37–47)
HEMOGLOBIN: 12.9 GM/DL (ref 12–16)
IMMATURE GRANS (ABS): 0.03 THOU/MM3 (ref 0–0.07)
IMMATURE GRANULOCYTES: 0.3 %
LYMPHOCYTES # BLD: 19 %
LYMPHOCYTES ABSOLUTE: 1.8 THOU/MM3 (ref 1–4.8)
MCH RBC QN AUTO: 28.9 PG (ref 26–33)
MCHC RBC AUTO-ENTMCNC: 32.2 GM/DL (ref 32.2–35.5)
MCV RBC AUTO: 89.7 FL (ref 81–99)
MONOCYTES # BLD: 5.3 %
MONOCYTES ABSOLUTE: 0.5 THOU/MM3 (ref 0.4–1.3)
NUCLEATED RED BLOOD CELLS: 0 /100 WBC
PLATELET # BLD: 256 THOU/MM3 (ref 130–400)
PMV BLD AUTO: 9.5 FL (ref 9.4–12.4)
POTASSIUM SERPL-SCNC: 4.9 MEQ/L (ref 3.5–5.2)
RBC # BLD: 4.47 MILL/MM3 (ref 4.2–5.4)
SEG NEUTROPHILS: 71.3 %
SEGMENTED NEUTROPHILS ABSOLUTE COUNT: 6.6 THOU/MM3 (ref 1.8–7.7)
SODIUM BLD-SCNC: 140 MEQ/L (ref 135–145)
WBC # BLD: 9.3 THOU/MM3 (ref 4.8–10.8)

## 2022-10-26 PROCEDURE — 2580000003 HC RX 258: Performed by: SURGERY

## 2022-10-26 PROCEDURE — 36415 COLL VENOUS BLD VENIPUNCTURE: CPT

## 2022-10-26 PROCEDURE — 85025 COMPLETE CBC W/AUTO DIFF WBC: CPT

## 2022-10-26 PROCEDURE — 82948 REAGENT STRIP/BLOOD GLUCOSE: CPT

## 2022-10-26 PROCEDURE — 99232 SBSQ HOSP IP/OBS MODERATE 35: CPT | Performed by: SURGERY

## 2022-10-26 PROCEDURE — 86140 C-REACTIVE PROTEIN: CPT

## 2022-10-26 PROCEDURE — 6370000000 HC RX 637 (ALT 250 FOR IP): Performed by: SURGERY

## 2022-10-26 PROCEDURE — 1200000000 HC SEMI PRIVATE

## 2022-10-26 PROCEDURE — 6360000002 HC RX W HCPCS: Performed by: SURGERY

## 2022-10-26 PROCEDURE — 74022 RADEX COMPL AQT ABD SERIES: CPT

## 2022-10-26 PROCEDURE — 80048 BASIC METABOLIC PNL TOTAL CA: CPT

## 2022-10-26 RX ORDER — ALBUTEROL SULFATE 90 UG/1
2 AEROSOL, METERED RESPIRATORY (INHALATION) EVERY 6 HOURS PRN
Status: DISCONTINUED | OUTPATIENT
Start: 2022-10-26 | End: 2022-10-28 | Stop reason: HOSPADM

## 2022-10-26 RX ADMIN — LOSARTAN POTASSIUM 100 MG: 100 TABLET, FILM COATED ORAL at 09:03

## 2022-10-26 RX ADMIN — AMLODIPINE BESYLATE 10 MG: 10 TABLET ORAL at 09:03

## 2022-10-26 RX ADMIN — ACETAMINOPHEN 650 MG: 325 TABLET ORAL at 09:02

## 2022-10-26 RX ADMIN — METOPROLOL TARTRATE 50 MG: 50 TABLET, FILM COATED ORAL at 09:03

## 2022-10-26 RX ADMIN — SODIUM CHLORIDE: 9 INJECTION, SOLUTION INTRAVENOUS at 02:32

## 2022-10-26 RX ADMIN — ENOXAPARIN SODIUM 40 MG: 100 INJECTION SUBCUTANEOUS at 19:39

## 2022-10-26 RX ADMIN — ACETAMINOPHEN 650 MG: 325 TABLET ORAL at 23:38

## 2022-10-26 RX ADMIN — ACETAMINOPHEN 650 MG: 325 TABLET ORAL at 14:53

## 2022-10-26 RX ADMIN — SODIUM CHLORIDE: 9 INJECTION, SOLUTION INTRAVENOUS at 12:08

## 2022-10-26 RX ADMIN — HYDROMORPHONE HYDROCHLORIDE 1 MG: 1 INJECTION, SOLUTION INTRAMUSCULAR; INTRAVENOUS; SUBCUTANEOUS at 02:23

## 2022-10-26 RX ADMIN — METOPROLOL TARTRATE 50 MG: 50 TABLET, FILM COATED ORAL at 19:39

## 2022-10-26 ASSESSMENT — PAIN DESCRIPTION - DESCRIPTORS
DESCRIPTORS: ACHING;PRESSURE
DESCRIPTORS: PRESSURE
DESCRIPTORS: ACHING;PRESSURE
DESCRIPTORS: NAGGING;ACHING
DESCRIPTORS: ACHING;PRESSURE
DESCRIPTORS: PRESSURE

## 2022-10-26 ASSESSMENT — PAIN - FUNCTIONAL ASSESSMENT
PAIN_FUNCTIONAL_ASSESSMENT: ACTIVITIES ARE NOT PREVENTED

## 2022-10-26 ASSESSMENT — PAIN SCALES - GENERAL
PAINLEVEL_OUTOF10: 3
PAINLEVEL_OUTOF10: 6
PAINLEVEL_OUTOF10: 7
PAINLEVEL_OUTOF10: 2
PAINLEVEL_OUTOF10: 4

## 2022-10-26 ASSESSMENT — PAIN DESCRIPTION - ONSET: ONSET: PROGRESSIVE

## 2022-10-26 ASSESSMENT — PAIN DESCRIPTION - LOCATION
LOCATION: ABDOMEN
LOCATION: HEAD
LOCATION: ABDOMEN

## 2022-10-26 ASSESSMENT — PAIN DESCRIPTION - PAIN TYPE: TYPE: ACUTE PAIN

## 2022-10-26 ASSESSMENT — PAIN DESCRIPTION - ORIENTATION
ORIENTATION: LEFT
ORIENTATION: LEFT
ORIENTATION: UPPER
ORIENTATION: LEFT

## 2022-10-26 NOTE — FLOWSHEET NOTE
10/26/22 0243   Treatment Team Notification   Reason for Communication Abnormal vitals   Team Member Name [facility] Team Role Advanced Practice Nurse   Method of Communication Secure Message   Response Waiting for response   Notification Time [phone]       Provider notified. Pt placed on 1L of oxygen. 02 sats 80%-88% on room air. Pt encouraged to deep breath,use incentive spirometer, and HOB elevated.      Update: see orders

## 2022-10-26 NOTE — PLAN OF CARE
Problem: Chronic Conditions and Co-morbidities  Goal: Patient's chronic conditions and co-morbidity symptoms are monitored and maintained or improved  Outcome: Progressing  Flowsheets (Taken 10/26/2022 1739)  Care Plan - Patient's Chronic Conditions and Co-Morbidity Symptoms are Monitored and Maintained or Improved: Monitor and assess patient's chronic conditions and comorbid symptoms for stability, deterioration, or improvement     Problem: Discharge Planning  Goal: Discharge to home or other facility with appropriate resources  Outcome: Progressing  Flowsheets (Taken 10/26/2022 1739)  Discharge to home or other facility with appropriate resources:   Identify discharge learning needs (meds, wound care, etc)   Identify barriers to discharge with patient and caregiver   Refer to discharge planning if patient needs post-hospital services based on physician order or complex needs related to functional status, cognitive ability or social support system  Note: Patient understands teaching and at home care.       Problem: Pain  Goal: Verbalizes/displays adequate comfort level or baseline comfort level  10/26/2022 1742 by Felipe Ruff (Taken 10/26/2022 1742)  Verbalizes/displays adequate comfort level or baseline comfort level:   Administer analgesics based on type and severity of pain and evaluate response   Encourage patient to monitor pain and request assistance   Assess pain using appropriate pain scale   Implement non-pharmacological measures as appropriate and evaluate response     Problem: Cardiovascular - Adult  Goal: Maintains optimal cardiac output and hemodynamic stability  10/26/2022 1742 by Felipe Ruff (Taken 10/26/2022 1742)  Maintains optimal cardiac output and hemodynamic stability:   Monitor blood pressure and heart rate   Administer vasoactive medications as ordered   Monitor urine output and notify Licensed Independent Practitioner for values outside of normal range     Problem: Gastrointestinal - Adult  Goal: Minimal or absence of nausea and vomiting  10/26/2022 1742 by Lluvia Schrader (Taken 10/26/2022 1742)  Minimal or absence of nausea and vomiting: (NG tube removed)   Administer IV fluids as ordered to ensure adequate hydration   Maintain NPO status until nausea and vomiting are resolved   Advance diet as tolerated, if ordered   Provide nonpharmacologic comfort measures as appropriate     Problem: Gastrointestinal - Adult  Goal: Maintains or returns to baseline bowel function  10/26/2022 1742 by Lluvia Schrader (Taken 10/26/2022 1742)  Maintains or returns to baseline bowel function:   Assess bowel function   Encourage mobilization and activity   Encourage oral fluids to ensure adequate hydration   Administer ordered medications as needed   Administer IV fluids as ordered to ensure adequate hydration     Problem: Gastrointestinal - Adult  Goal: Maintains adequate nutritional intake  10/26/2022 1742 by Lluvia Schrader (Taken 10/26/2022 1742)  Maintains adequate nutritional intake:   Monitor intake and output, weight and lab values   Monitor percentage of each meal consumed     Problem: Metabolic/Fluid and Electrolytes - Adult  Goal: Electrolytes maintained within normal limits  10/26/2022 1742 by Lluvia Schrader (Taken 10/26/2022 1742)  Electrolytes maintained within normal limits:   Monitor labs and assess patient for signs and symptoms of electrolyte imbalances   Monitor response to electrolyte replacements, including repeat lab results as appropriate   Instruct patient on fluid and nutrition restrictions as appropriate     Problem: Metabolic/Fluid and Electrolytes - Adult  Goal: Hemodynamic stability and optimal renal function maintained  Outcome: Progressing     Problem: Safety - Adult  Goal: Free from fall injury  10/26/2022 1742 by Lluvia Schrader (Taken 10/26/2022 1742)  Free From Fall Injury: Instruct family/caregiver on patient safety

## 2022-10-26 NOTE — CARE COORDINATION
Case Management Assessment  Initial Evaluation    Date/Time of Evaluation: 10/26/2022 12:05 PM  Assessment Completed by: Mingo Cottrell RN    If patient is discharged prior to next notation, then this note serves as note for discharge by case management. Patient Name: Percy Bermudez                   YOB: 1970  Diagnosis: Small bowel obstruction [facility]) [C89.770]                   Date / Time: 10/25/2022  9:37 AM    Patient Admission Status: Inpatient     Current PCP: MAXWELL Khan CNP  PCP verified by CM? Yes    Chart Reviewed: Yes      Patient Orientation: Alert and Oriented    Patient Cognition: Alert  History Provided by: Patient    Hospitalization in the last 30 days (Readmission):  No    If yes, Readmission Assessment in  Navigator will be completed. Advance Directives:     Code Status: Full Code     Primary Decision Maker: Susie Sheets - [phone]    Discharge Planning  Patient lives with: Spouse/Significant Other Type of Home: House   Primary Caregiver: Self  Patient Support Systems include: Spouse/Significant Other, Family Members   Current Financial resources: Other (Comment) (Commercial insurance)  Current community resources:  (none)  Current services prior to admission: None   Type of Home Care services:  None    ADLS  Prior functional level: Independent in ADLs/IADLs  Current functional level: Independent in ADLs/IADLs      Family can provide assistance at DC: Yes  Would you like Case Management to discuss the discharge plan with any other family members/significant others, and if so, who?  No  Plans to Return to Present Housing: Yes  Other Identified Issues/Barriers to RETURNING to current housing: no  Potential Assistance needed at discharge: N/A  Patient expects to discharge to: [address] [place] [address] for transportation at discharge: Self    Financial  Payor: [employer] / Plan: Cliff Reis [address] / Product Type: *No Product type* /     Does insurance require precert for SNF: Yes    Potential assistance Purchasing Medications: No  Meds-to-Beds request: Yes      [address] [facility], [address] [place] [address]  Phone: [phone] Fax: [phone]      Factors facilitating achievement of predicted outcomes: Family support, Motivated, Cooperative, and Pleasant    Barriers to discharge: Medical complications    Additional Case Management Notes: Admit as inpatient from [address] [place] [address] with abdominal pain, emesis, fever, and nausea. NG tube placed, to LIWS. Placed on 2L O2 overnight. IVF. Glucose management. NPO.    10/25 CT Abd/Plv: Multiple dilated small bowel loops with bowel wall enhancement and thickening transition left lower quadrant. Suspicious for small bowel obstruction with generalized enteritis  10/25 KUB: The nasogastric catheter extends below the hemidiaphragms with tip in the left upper quadrant in the projection of the stomach. Multiple dilated small bowel loops with little distal bowel gas again noted consistent with small bowel obstruction  10/26 XR Abd: Mild atelectasis in the right lung base. No residual distended small bowel loops. 10/25/22 10:07 10/26/22 06:00   CRP  2.72 (H)   WBC 18.8 (H) 9.3       The Plan for Transition of Care is related to the following treatment goals of Small bowel obstruction ([facility] [address] [K56.609]    Patient Goals/Plan/Treatment Preferences: From home with . Denies any needs at this time. CM will follow. Transportation/Food Security/Housekeeping Addressed:  No issues identified.      Kiki Oswald RN  Case Management Department

## 2022-10-26 NOTE — PROGRESS NOTES
DO DAYDAY [address] GENERAL SURGERY   General Surgery Daily Progress Note    Pt Name: Yasmani Weiner [place] Record Number: [id number]  Date of Birth 1970   Today's Date: 10/26/2022  Chief complaint: Feeling better  [address] [facility] day # 1   SBO either related to adhesions or Crohn's stricture  Hx HTN  Type 2 diabetes   has a past medical history of Anxiety, Crohn's disease ([facility] [address] Depression, Diverticulosis, Histoplasmosis, Hypertension, Irritable bowel syndrome with both constipation and diarrhea, and Type 2 diabetes mellitus without complication, without long-term current use of insulin ([facility] [address] PLAN   Decreased IV hydration  Analgesics and antiemetics as needed  Clamp NG  Clear liquid diet as tolerated  Lovenox for DVT prophylaxis  Increase activity  SUBJECTIVE   Elly Hernandez is doing better. She denies any nausea or vomiting, has passed flatus not had a bowel movement. She is tolerating a ADULT DIET; Clear Liquid; 4 carb choices (60 gm/meal). Her pain is well controlled on current medications. She has been ambulating in the room. Abd pain has resolved  CURRENT MEDICATIONS   Scheduled Meds:   amLODIPine  10 mg Oral Daily    losartan  100 mg Oral Daily    metoprolol tartrate  50 mg Oral BID    venlafaxine  112.5 mg Oral Daily    sodium chloride flush  5-40 mL IntraVENous 2 times per day    enoxaparin  40 mg SubCUTAneous Q24H    insulin lispro  0-8 Units SubCUTAneous Q4H     Continuous Infusions:   dextrose      sodium chloride 125 mL/hr at 10/26/22 0232    sodium chloride       PRN Meds:.albuterol sulfate HFA, traZODone, glucose, dextrose bolus **OR** dextrose bolus, dextrose, sodium chloride flush, sodium chloride, acetaminophen **OR** acetaminophen, ondansetron, HYDROmorphone **OR** HYDROmorphone  OBJECTIVE   CURRENT VITALS:  height is 5' 6\" (1.676 m) and weight is 198 lb (89.8 kg). Her oral temperature is 98.6 °F (37 °C). Her blood pressure is 141/93 (abnormal) and her pulse is 108 (abnormal). Her respiration is 18 and oxygen saturation is 90%. Temperature Range (24h):Temp: 98.6 °F (37 °C) Temp  Av °F (36.7 °C)  Min: 96.6 °F (35.9 °C)  Max: 98.6 °F (37 °C)  BP Range (97L): Systolic (02HNX), FVB:616 , Min:113 , ZTU:521     Diastolic (47IEK), XHK:74, Min:76, Max:97    Pulse Range (24h): Pulse  Av.9  Min: 92  Max: 116  Respiration Range (24h): Resp  Av.8  Min: 16  Max: 20  Current Pulse Ox (24h):  SpO2: 90 %  Pulse Ox Range (24h):  SpO2  Av.1 %  Min: 80 %  Max: 96 %  Oxygen Amount and Delivery: O2 Flow Rate (L/min): 2 L/min  Incentive Spirometry Tx:            GENERAL: alert, no distress  LUNGS: clear to ausculation, without wheezes, rales or rhonci  HEART: normal rate and regular rhythm  ABDOMEN: soft, non-tender, non-distended, bowel sounds present in all 4 quadrants, and no guarding or peritoneal signs  EXTREMITY: no cyanosis, clubbing or edema  In: -   Out: 1200 [Urine:900]  Date 10/26/22 0000 - 10/26/22 2359   Shift 5561-1129 5748-4306 8395-2561 24 Hour Total   INTAKE   Shift Total(mL/kg)       OUTPUT   Urine(mL/kg/hr) 600(0.8)   600   Emesis/NG output(mL/kg) 300(3.3)   300(3.3)   Shift Total(mL/kg) 900(10)   900(10)   Weight (kg) 89.8 89.8 89.8 89.8     LABS     Recent Labs     10/25/22  1007 10/26/22  0600   WBC 18.8* 9.3   HGB 15.2 12.9   HCT 45.6 40.1    256    140   K 4.4 4.9   CL 98 103   CO2 26 25   BUN 17 15   CREATININE 1.1 0.8   CALCIUM  --  9.2      No results for input(s): PTT, INR in the last 72 hours. Invalid input(s): PT  Recent Labs     10/25/22  1007   AST 11*   ALT 32   BILITOT 0.6   LIPASE 123.0     No results for input(s): TROPONINT in the last 72 hours. RADIOLOGY     XR ACUTE ABD SERIES CHEST 1 VW   Final Result   1. Mild atelectasis in the right lung base. 2. No residual distended small bowel loops. **This report has been created using voice recognition software.  It may contain minor errors which are inherent in voice recognition technology. **      Final report electronically signed by Dr. Maxwell Jung on 10/26/2022 7:40 AM      XR ABDOMEN (KUB) (SINGLE AP VIEW)   Final Result   The nasogastric catheter extends below the hemidiaphragms with tip in the left upper quadrant in the projection of the stomach. Multiple dilated small bowel loops with little distal bowel gas again noted consistent with small bowel obstruction. **This report has been created using voice recognition software. It may contain minor errors which are inherent in voice recognition technology. **      Final report electronically signed by Dr Karly Mcnally on 10/25/2022 12:25 PM      CT ABDOMEN PELVIS W IV CONTRAST Additional Contrast? None   Final Result   1. Multiple dilated small bowel loops with bowel wall enhancement and thickening transition left lower quadrant. Suspicious for small bowel obstruction with generalized enteritis. **This report has been created using voice recognition software. It may contain minor errors which are inherent in voice recognition technology. **      Final report electronically signed by Dr. Arminda Saldaña on 10/25/2022 10:50 AM      XR ACUTE ABD SERIES CHEST 1 VW    (Results Pending)   XR CHEST PORTABLE    (Results Pending)         Electronically signed by Soledad Nichole DO on 10/26/2022 at 8:41 AM

## 2022-10-26 NOTE — PLAN OF CARE
Problem: Chronic Conditions and Co-morbidities  Goal: Patient's chronic conditions and co-morbidity symptoms are monitored and maintained or improved  Outcome: Progressing  Flowsheets (Taken 10/26/2022 0246)  Care Plan - Patient's Chronic Conditions and Co-Morbidity Symptoms are Monitored and Maintained or Improved:   Monitor and assess patient's chronic conditions and comorbid symptoms for stability, deterioration, or improvement   Collaborate with multidisciplinary team to address chronic and comorbid conditions and prevent exacerbation or deterioration     Problem: Discharge Planning  Goal: Discharge to home or other facility with appropriate resources  Outcome: Progressing  Flowsheets (Taken 10/26/2022 0246)  Discharge to home or other facility with appropriate resources:   Identify barriers to discharge with patient and caregiver   Arrange for needed discharge resources and transportation as appropriate   Identify discharge learning needs (meds, wound care, etc)   Arrange for interpreters to assist at discharge as needed     Problem: Pain  Goal: Verbalizes/displays adequate comfort level or baseline comfort level  Outcome: Progressing  Flowsheets (Taken 10/26/2022 0246)  Verbalizes/displays adequate comfort level or baseline comfort level:   Encourage patient to monitor pain and request assistance   Assess pain using appropriate pain scale   Administer analgesics based on type and severity of pain and evaluate response   Implement non-pharmacological measures as appropriate and evaluate response     Problem: Cardiovascular - Adult  Goal: Maintains optimal cardiac output and hemodynamic stability  Outcome: Progressing  Flowsheets (Taken 10/26/2022 0246)  Maintains optimal cardiac output and hemodynamic stability:   Monitor blood pressure and heart rate   Monitor urine output and notify Licensed Independent Practitioner for values outside of normal range     Problem: Gastrointestinal - Adult  Goal: Minimal or absence of nausea and vomiting  Outcome: Progressing  Flowsheets (Taken 10/26/2022 0246)  Minimal or absence of nausea and vomiting:   Administer IV fluids as ordered to ensure adequate hydration   Administer ordered antiemetic medications as needed     Problem: Metabolic/Fluid and Electrolytes - Adult  Goal: Electrolytes maintained within normal limits  Outcome: Progressing  Flowsheets (Taken 10/26/2022 0246)  Electrolytes maintained within normal limits:   Monitor labs and assess patient for signs and symptoms of electrolyte imbalances   Administer electrolyte replacement as ordered     Problem: Safety - Adult  Goal: Free from fall injury  Outcome: Progressing  Flowsheets (Taken 10/26/2022 0246)  Free From Fall Injury:   Instruct family/caregiver on patient safety   Based on caregiver fall risk screen, instruct family/caregiver to ask for assistance with transferring infant if caregiver noted to have fall risk factors     Problem: Metabolic/Fluid and Electrolytes - Adult  Goal: Hemodynamic stability and optimal renal function maintained  Outcome: Progressing  Care plan reviewed with patient. Patient verbalize understanding of the plan of care and contribute to goal setting.

## 2022-10-27 ENCOUNTER — APPOINTMENT (OUTPATIENT)
Dept: GENERAL RADIOLOGY | Age: 52
DRG: 386 | End: 2022-10-27
Payer: COMMERCIAL

## 2022-10-27 LAB
ANION GAP SERPL CALCULATED.3IONS-SCNC: 11 MEQ/L (ref 8–16)
BASOPHILS # BLD: 0.3 %
BASOPHILS ABSOLUTE: 0 THOU/MM3 (ref 0–0.1)
BUN BLDV-MCNC: 11 MG/DL (ref 7–22)
CALCIUM SERPL-MCNC: 9.2 MG/DL (ref 8.5–10.5)
CHLORIDE BLD-SCNC: 103 MEQ/L (ref 98–111)
CO2: 26 MEQ/L (ref 23–33)
CREAT SERPL-MCNC: 0.7 MG/DL (ref 0.4–1.2)
EOSINOPHIL # BLD: 3.4 %
EOSINOPHILS ABSOLUTE: 0.2 THOU/MM3 (ref 0–0.4)
ERYTHROCYTE [DISTWIDTH] IN BLOOD BY AUTOMATED COUNT: 12.7 % (ref 11.5–14.5)
ERYTHROCYTE [DISTWIDTH] IN BLOOD BY AUTOMATED COUNT: 39.9 FL (ref 35–45)
GFR SERPL CREATININE-BSD FRML MDRD: > 60 ML/MIN/1.73M2
GLUCOSE BLD-MCNC: 112 MG/DL (ref 70–108)
GLUCOSE BLD-MCNC: 205 MG/DL (ref 70–108)
HCT VFR BLD CALC: 36.2 % (ref 37–47)
HEMOGLOBIN: 11.9 GM/DL (ref 12–16)
IMMATURE GRANS (ABS): 0.02 THOU/MM3 (ref 0–0.07)
IMMATURE GRANULOCYTES: 0.3 %
LYMPHOCYTES # BLD: 22 %
LYMPHOCYTES ABSOLUTE: 1.5 THOU/MM3 (ref 1–4.8)
MCH RBC QN AUTO: 28.7 PG (ref 26–33)
MCHC RBC AUTO-ENTMCNC: 32.9 GM/DL (ref 32.2–35.5)
MCV RBC AUTO: 87.4 FL (ref 81–99)
MONOCYTES # BLD: 5.6 %
MONOCYTES ABSOLUTE: 0.4 THOU/MM3 (ref 0.4–1.3)
NUCLEATED RED BLOOD CELLS: 0 /100 WBC
PLATELET # BLD: 239 THOU/MM3 (ref 130–400)
PMV BLD AUTO: 9.6 FL (ref 9.4–12.4)
POTASSIUM SERPL-SCNC: 3.8 MEQ/L (ref 3.5–5.2)
RBC # BLD: 4.14 MILL/MM3 (ref 4.2–5.4)
SEG NEUTROPHILS: 68.4 %
SEGMENTED NEUTROPHILS ABSOLUTE COUNT: 4.8 THOU/MM3 (ref 1.8–7.7)
SODIUM BLD-SCNC: 140 MEQ/L (ref 135–145)
WBC # BLD: 7 THOU/MM3 (ref 4.8–10.8)

## 2022-10-27 PROCEDURE — 36415 COLL VENOUS BLD VENIPUNCTURE: CPT

## 2022-10-27 PROCEDURE — 80048 BASIC METABOLIC PNL TOTAL CA: CPT

## 2022-10-27 PROCEDURE — 6360000002 HC RX W HCPCS: Performed by: SURGERY

## 2022-10-27 PROCEDURE — 85025 COMPLETE CBC W/AUTO DIFF WBC: CPT

## 2022-10-27 PROCEDURE — 82948 REAGENT STRIP/BLOOD GLUCOSE: CPT

## 2022-10-27 PROCEDURE — 74022 RADEX COMPL AQT ABD SERIES: CPT

## 2022-10-27 PROCEDURE — 1200000000 HC SEMI PRIVATE

## 2022-10-27 PROCEDURE — 6370000000 HC RX 637 (ALT 250 FOR IP): Performed by: SURGERY

## 2022-10-27 PROCEDURE — 2580000003 HC RX 258: Performed by: SURGERY

## 2022-10-27 PROCEDURE — 99232 SBSQ HOSP IP/OBS MODERATE 35: CPT | Performed by: SURGERY

## 2022-10-27 RX ADMIN — SODIUM CHLORIDE: 9 INJECTION, SOLUTION INTRAVENOUS at 01:17

## 2022-10-27 RX ADMIN — AMLODIPINE BESYLATE 10 MG: 10 TABLET ORAL at 08:03

## 2022-10-27 RX ADMIN — ENOXAPARIN SODIUM 40 MG: 100 INJECTION SUBCUTANEOUS at 20:42

## 2022-10-27 RX ADMIN — METOPROLOL TARTRATE 50 MG: 50 TABLET, FILM COATED ORAL at 20:43

## 2022-10-27 RX ADMIN — ACETAMINOPHEN 650 MG: 325 TABLET ORAL at 08:04

## 2022-10-27 RX ADMIN — METOPROLOL TARTRATE 50 MG: 50 TABLET, FILM COATED ORAL at 08:03

## 2022-10-27 RX ADMIN — ACETAMINOPHEN 650 MG: 325 TABLET ORAL at 15:34

## 2022-10-27 RX ADMIN — LOSARTAN POTASSIUM 100 MG: 100 TABLET, FILM COATED ORAL at 08:03

## 2022-10-27 ASSESSMENT — PAIN DESCRIPTION - DESCRIPTORS
DESCRIPTORS: ACHING
DESCRIPTORS: ACHING

## 2022-10-27 ASSESSMENT — PAIN SCALES - GENERAL
PAINLEVEL_OUTOF10: 0
PAINLEVEL_OUTOF10: 4
PAINLEVEL_OUTOF10: 3
PAINLEVEL_OUTOF10: 0
PAINLEVEL_OUTOF10: 0

## 2022-10-27 ASSESSMENT — PAIN DESCRIPTION - LOCATION
LOCATION: HEAD
LOCATION: HEAD

## 2022-10-27 ASSESSMENT — PAIN DESCRIPTION - ORIENTATION
ORIENTATION: ANTERIOR
ORIENTATION: ANTERIOR

## 2022-10-27 NOTE — PLAN OF CARE
Problem: Chronic Conditions and Co-morbidities  Goal: Patient's chronic conditions and co-morbidity symptoms are monitored and maintained or improved  10/27/2022 0001 by Ned Kolb RN  Outcome: Progressing  Flowsheets (Taken 10/26/2022 1933)  Care Plan - Patient's Chronic Conditions and Co-Morbidity Symptoms are Monitored and Maintained or Improved:   Monitor and assess patient's chronic conditions and comorbid symptoms for stability, deterioration, or improvement   Collaborate with multidisciplinary team to address chronic and comorbid conditions and prevent exacerbation or deterioration   Update acute care plan with appropriate goals if chronic or comorbid symptoms are exacerbated and prevent overall improvement and discharge     Problem: Discharge Planning  Goal: Discharge to home or other facility with appropriate resources  10/27/2022 0001 by Ned Kolb RN  Outcome: Progressing  Flowsheets (Taken 10/26/2022 1933)  Discharge to home or other facility with appropriate resources:   Identify barriers to discharge with patient and caregiver   Arrange for needed discharge resources and transportation as appropriate   Identify discharge learning needs (meds, wound care, etc)   Refer to discharge planning if patient needs post-hospital services based on physician order or complex needs related to functional status, cognitive ability or social support system     Problem: Pain  Goal: Verbalizes/displays adequate comfort level or baseline comfort level  10/27/2022 0001 by Ned Kolb RN  Outcome: Progressing  Flowsheets (Taken 10/27/2022 0001)  Verbalizes/displays adequate comfort level or baseline comfort level:   Encourage patient to monitor pain and request assistance   Assess pain using appropriate pain scale   Administer analgesics based on type and severity of pain and evaluate response   Implement non-pharmacological measures as appropriate and evaluate response     Problem: Cardiovascular - Adult  Goal: Maintains optimal cardiac output and hemodynamic stability  10/27/2022 0001 by Zofia Rosado RN  Outcome: Progressing  Flowsheets (Taken 10/26/2022 1933)  Maintains optimal cardiac output and hemodynamic stability:   Monitor blood pressure and heart rate   Monitor urine output and notify Licensed Independent Practitioner for values outside of normal range   Assess for signs of decreased cardiac output     Problem: Gastrointestinal - Adult  Goal: Maintains adequate nutritional intake  10/27/2022 0001 by Zofia Rosado RN  Outcome: Progressing  Flowsheets (Taken 10/26/2022 1933)  Maintains adequate nutritional intake:   Monitor percentage of each meal consumed   Monitor intake and output, weight and lab values     Problem: Metabolic/Fluid and Electrolytes - Adult  Goal: Electrolytes maintained within normal limits  10/27/2022 0001 by Zofia Rosado RN  Outcome: Progressing  Flowsheets (Taken 10/26/2022 1933)  Electrolytes maintained within normal limits: Monitor labs and assess patient for signs and symptoms of electrolyte imbalances     Problem: Safety - Adult  Goal: Free from fall injury  10/27/2022 0001 by Zofia Rosado RN  Outcome: Progressing  Flowsheets (Taken 10/27/2022 0001)  Free From Fall Injury: Instruct family/caregiver on patient safety   Care plan reviewed with patient. Patient  verbalize understanding of the plan of care and contribute to goal setting.

## 2022-10-27 NOTE — PROGRESS NOTES
DO DAYDAY [address] GENERAL SURGERY   General Surgery Daily Progress Note    Pt Name: Yasmani Weiner [place] Record Number: [id number]  Date of Birth 1970   Today's Date: 10/27/2022  Chief complaint: Feeling better  [address] day # 2   SBO either related to adhesions or Crohn's stricture  Hx HTN  Type 2 diabetes   has a past medical history of Anxiety, Crohn's disease ([facility] [address] Depression, Diverticulosis, Histoplasmosis, Hypertension, Irritable bowel syndrome with both constipation and diarrhea, and Type 2 diabetes mellitus without complication, without long-term current use of insulin ([facility] [address] PLAN   Decreased IV hydration  Analgesics and antiemetics as needed  Full liquid diet advancing to low residue diet as tolerated  Lovenox for DVT prophylaxis  Increase activity  SUBJECTIVE   Demetria Leong is doing better. She denies any nausea or vomiting, has passed flatus not had a bowel movement. She is tolerating a ADULT DIET; Full Liquid; 4 carb choices (60 gm/meal). Her pain is well controlled on current medications. She has been ambulating in the room. Abd pain has resolved  CURRENT MEDICATIONS   Scheduled Meds:   amLODIPine  10 mg Oral Daily    losartan  100 mg Oral Daily    metoprolol tartrate  50 mg Oral BID    venlafaxine  112.5 mg Oral Daily    sodium chloride flush  5-40 mL IntraVENous 2 times per day    enoxaparin  40 mg SubCUTAneous Q24H     Continuous Infusions:   dextrose      sodium chloride 75 mL/hr at 10/27/22 0117    sodium chloride       PRN Meds:.albuterol sulfate HFA, traZODone, glucose, dextrose bolus **OR** dextrose bolus, dextrose, sodium chloride flush, sodium chloride, acetaminophen **OR** acetaminophen, ondansetron, HYDROmorphone **OR** HYDROmorphone  OBJECTIVE   CURRENT VITALS:  height is 5' 6\" (1.676 m) and weight is 198 lb (89.8 kg). Her oral temperature is 98.7 °F (37.1 °C). Her blood pressure is 144/82 (abnormal) and her pulse is 88.  Her respiration is 16 and oxygen saturation is 92%. Temperature Range (24h):Temp: 98.7 °F (37.1 °C) Temp  Av.7 °F (37.1 °C)  Min: 98.1 °F (36.7 °C)  Max: 99.2 °F (37.3 °C)  BP Range (12O): Systolic (36IQM), YRN:879 , Min:121 , COK:496     Diastolic (61ZSP), CKB:21, Min:71, Max:89    Pulse Range (24h): Pulse  Av.6  Min: 78  Max: 102  Respiration Range (24h): Resp  Av.9  Min: 16  Max: 18  Current Pulse Ox (24h):  SpO2: 92 %  Pulse Ox Range (24h):  SpO2  Av.9 %  Min: 86 %  Max: 98 %  Oxygen Amount and Delivery: O2 Flow Rate (L/min): 2 L/min  Incentive Spirometry Tx: Achieved Volume (mL): 750 mL    GENERAL: alert, no distress  LUNGS: clear to ausculation, without wheezes, rales or rhonci  HEART: normal rate and regular rhythm  ABDOMEN: soft, non-tender, non-distended, bowel sounds present in all 4 quadrants, and no guarding or peritoneal signs  EXTREMITY: no cyanosis, clubbing or edema  In: 1780.4 [P.O.:810; I.V.:880.4; NG/GT:90]  Out:  [Urine:1550]      LABS     Recent Labs     10/25/22  1007 10/26/22  0600 10/27/22  0515   WBC 18.8* 9.3 7.0   HGB 15.2 12.9 11.9*   HCT 45.6 40.1 36.2*    256 239    140 140   K 4.4 4.9 3.8   CL 98 103 103   CO2 26 25 26   BUN 17 15 11   CREATININE 1.1 0.8 0.7   CALCIUM  --  9.2 9.2      No results for input(s): PTT, INR in the last 72 hours. Invalid input(s): PT  Recent Labs     10/25/22  1007   AST 11*   ALT 32   BILITOT 0.6   LIPASE 123.0     No results for input(s): TROPONINT in the last 72 hours. RADIOLOGY     XR ACUTE ABD SERIES CHEST 1 VW   Final Result   Stable right basilar atelectasis and suspected right effusion. No free air or bowel obstruction. Dense retained colonic stool. This document has been electronically signed by: Yair Marrero MD on    10/27/2022 06:52 AM      XR ACUTE ABD SERIES CHEST 1 VW   Final Result   1. Mild atelectasis in the right lung base. 2. No residual distended small bowel loops.                 **This report has been created using voice recognition software. It may contain minor errors which are inherent in voice recognition technology. **      Final report electronically signed by Dr. Jase Cuevas on 10/26/2022 7:40 AM      XR ABDOMEN (KUB) (SINGLE AP VIEW)   Final Result   The nasogastric catheter extends below the hemidiaphragms with tip in the left upper quadrant in the projection of the stomach. Multiple dilated small bowel loops with little distal bowel gas again noted consistent with small bowel obstruction. **This report has been created using voice recognition software. It may contain minor errors which are inherent in voice recognition technology. **      Final report electronically signed by Dr Faith Rodriguez on 10/25/2022 12:25 PM      CT ABDOMEN PELVIS W IV CONTRAST Additional Contrast? None   Final Result   1. Multiple dilated small bowel loops with bowel wall enhancement and thickening transition left lower quadrant. Suspicious for small bowel obstruction with generalized enteritis. **This report has been created using voice recognition software. It may contain minor errors which are inherent in voice recognition technology. **      Final report electronically signed by Dr. Zahra Zavala on 10/25/2022 10:50 AM            Electronically signed by Rodo Case DO on 10/27/2022 at 10:29 AM

## 2022-10-28 VITALS
WEIGHT: 198 LBS | HEIGHT: 66 IN | RESPIRATION RATE: 16 BRPM | HEART RATE: 79 BPM | TEMPERATURE: 98.3 F | OXYGEN SATURATION: 96 % | DIASTOLIC BLOOD PRESSURE: 94 MMHG | BODY MASS INDEX: 31.82 KG/M2 | SYSTOLIC BLOOD PRESSURE: 161 MMHG

## 2022-10-28 LAB
ANION GAP SERPL CALCULATED.3IONS-SCNC: 10 MEQ/L (ref 8–16)
BASOPHILS # BLD: 0.5 %
BASOPHILS ABSOLUTE: 0 THOU/MM3 (ref 0–0.1)
BUN BLDV-MCNC: 12 MG/DL (ref 7–22)
CALCIUM SERPL-MCNC: 9.2 MG/DL (ref 8.5–10.5)
CHLORIDE BLD-SCNC: 101 MEQ/L (ref 98–111)
CO2: 28 MEQ/L (ref 23–33)
CREAT SERPL-MCNC: 0.8 MG/DL (ref 0.4–1.2)
EOSINOPHIL # BLD: 4.4 %
EOSINOPHILS ABSOLUTE: 0.3 THOU/MM3 (ref 0–0.4)
ERYTHROCYTE [DISTWIDTH] IN BLOOD BY AUTOMATED COUNT: 12.5 % (ref 11.5–14.5)
ERYTHROCYTE [DISTWIDTH] IN BLOOD BY AUTOMATED COUNT: 39.1 FL (ref 35–45)
GFR SERPL CREATININE-BSD FRML MDRD: > 60 ML/MIN/1.73M2
GLUCOSE BLD-MCNC: 155 MG/DL (ref 70–108)
HCT VFR BLD CALC: 37.8 % (ref 37–47)
HEMOGLOBIN: 12.8 GM/DL (ref 12–16)
IMMATURE GRANS (ABS): 0.03 THOU/MM3 (ref 0–0.07)
IMMATURE GRANULOCYTES: 0.5 %
LYMPHOCYTES # BLD: 20.8 %
LYMPHOCYTES ABSOLUTE: 1.3 THOU/MM3 (ref 1–4.8)
MCH RBC QN AUTO: 29.2 PG (ref 26–33)
MCHC RBC AUTO-ENTMCNC: 33.9 GM/DL (ref 32.2–35.5)
MCV RBC AUTO: 86.1 FL (ref 81–99)
MONOCYTES # BLD: 6.8 %
MONOCYTES ABSOLUTE: 0.4 THOU/MM3 (ref 0.4–1.3)
NUCLEATED RED BLOOD CELLS: 0 /100 WBC
PLATELET # BLD: 267 THOU/MM3 (ref 130–400)
PMV BLD AUTO: 9.8 FL (ref 9.4–12.4)
POTASSIUM SERPL-SCNC: 4.1 MEQ/L (ref 3.5–5.2)
RBC # BLD: 4.39 MILL/MM3 (ref 4.2–5.4)
SEG NEUTROPHILS: 67 %
SEGMENTED NEUTROPHILS ABSOLUTE COUNT: 4.2 THOU/MM3 (ref 1.8–7.7)
SODIUM BLD-SCNC: 139 MEQ/L (ref 135–145)
WBC # BLD: 6.3 THOU/MM3 (ref 4.8–10.8)

## 2022-10-28 PROCEDURE — 99232 SBSQ HOSP IP/OBS MODERATE 35: CPT | Performed by: SURGERY

## 2022-10-28 PROCEDURE — 80048 BASIC METABOLIC PNL TOTAL CA: CPT

## 2022-10-28 PROCEDURE — 36415 COLL VENOUS BLD VENIPUNCTURE: CPT

## 2022-10-28 PROCEDURE — 85025 COMPLETE CBC W/AUTO DIFF WBC: CPT

## 2022-10-28 PROCEDURE — 6370000000 HC RX 637 (ALT 250 FOR IP): Performed by: SURGERY

## 2022-10-28 RX ADMIN — ACETAMINOPHEN 650 MG: 325 TABLET ORAL at 10:09

## 2022-10-28 RX ADMIN — AMLODIPINE BESYLATE 10 MG: 10 TABLET ORAL at 08:17

## 2022-10-28 RX ADMIN — METOPROLOL TARTRATE 50 MG: 50 TABLET, FILM COATED ORAL at 08:17

## 2022-10-28 RX ADMIN — LOSARTAN POTASSIUM 100 MG: 100 TABLET, FILM COATED ORAL at 08:17

## 2022-10-28 ASSESSMENT — PAIN SCALES - GENERAL
PAINLEVEL_OUTOF10: 0

## 2022-10-28 NOTE — CARE COORDINATION
10/28/22, 10:19 AM EDT    Patient goals/plan/ treatment preferences discussed by  and . Patient goals/plan/ treatment preferences reviewed with patient/ family. Patient/ family verbalize understanding of discharge plan and are in agreement with goal/plan/treatment preferences. Understanding was demonstrated using the teach back method. AVS provided by RN at time of discharge, which includes all necessary medical information pertaining to the patients current course of illness, treatment, post-discharge goals of care, and treatment preferences.      Services At/After Discharge: None

## 2022-10-28 NOTE — PROGRESS NOTES
DO DAYDAY [address] DR GENERAL SURGERY   General Surgery Daily Progress Note    Pt Name: Yasmani Weiner [place] Record Number: [id number]  Date of Birth 1970   Today's Date: 10/28/2022  Chief complaint: Feeling better  [address] [facility] day # 3   SBO either related to adhesions or Crohn's stricture  Hx HTN  Type 2 diabetes   has a past medical history of Anxiety, Crohn's disease ([facility] [address] Depression, Diverticulosis, Histoplasmosis, Hypertension, Irritable bowel syndrome with both constipation and diarrhea, and Type 2 diabetes mellitus without complication, without long-term current use of insulin ([facility] [address] PLAN   Discharge home  Avoid large bulky foods  F/u in office in about 2 week. Jeraline Nyhan is doing better. She denies any nausea or vomiting, has passed flatus and had a bowel movement. She is tolerating a ADULT DIET; Regular; 4 carb choices (60 gm/meal); Low Fiber. Her pain is well controlled on current medications. She has been ambulating in the room. Abd pain has resolved  CURRENT MEDICATIONS   Scheduled Meds:   amLODIPine  10 mg Oral Daily    losartan  100 mg Oral Daily    metoprolol tartrate  50 mg Oral BID    venlafaxine  112.5 mg Oral Daily    sodium chloride flush  5-40 mL IntraVENous 2 times per day    enoxaparin  40 mg SubCUTAneous Q24H     Continuous Infusions:   dextrose      sodium chloride       PRN Meds:.albuterol sulfate HFA, traZODone, glucose, dextrose bolus **OR** dextrose bolus, dextrose, sodium chloride flush, sodium chloride, acetaminophen **OR** acetaminophen, ondansetron, HYDROmorphone **OR** HYDROmorphone  OBJECTIVE   CURRENT VITALS:  height is 5' 6\" (1.676 m) and weight is 198 lb (89.8 kg). Her oral temperature is 98.3 °F (36.8 °C). Her blood pressure is 161/94 (abnormal) and her pulse is 79. Her respiration is 16 and oxygen saturation is 96%.    Temperature Range (24h):Temp: 98.3 °F (36.8 °C) Temp  Av.4 °F (36.9 °C)  Min: 98.1 °F (36.7 °C)  Max: 99 °F (37.2 °C)  BP Range (76D): Systolic (97OGR), FBA:390 , Min:131 , NHE:127     Diastolic (08GIU), BYM:55, Min:82, Max:94    Pulse Range (24h): Pulse  Av.3  Min: 76  Max: 87  Respiration Range (24h): Resp  Av.3  Min: 16  Max: 18  Current Pulse Ox (24h):  SpO2: 96 %  Pulse Ox Range (24h):  SpO2  Av.4 %  Min: 92 %  Max: 96 %  Oxygen Amount and Delivery: O2 Flow Rate (L/min): 2 L/min  Incentive Spirometry Tx: Achieved Volume (mL): 750 mL    GENERAL: alert, no distress  LUNGS: clear to ausculation, without wheezes, rales or rhonci  HEART: normal rate and regular rhythm  ABDOMEN: soft, non-tender, non-distended, bowel sounds present in all 4 quadrants, and no guarding or peritoneal signs  EXTREMITY: no cyanosis, clubbing or edema  In: 1818.9 [P.O.:1020; I.V.:798.9]  Out: -       LABS     Recent Labs     10/26/22  0600 10/27/22  0515 10/28/22  0454   WBC 9.3 7.0 6.3   HGB 12.9 11.9* 12.8   HCT 40.1 36.2* 37.8    239 267    140 139   K 4.9 3.8 4.1    103 101   CO2 25 26 28   BUN 15 11 12   CREATININE 0.8 0.7 0.8   CALCIUM 9.2 9.2 9.2      No results for input(s): PTT, INR in the last 72 hours. Invalid input(s): PT  Recent Labs     10/25/22  1007   AST 11*   ALT 32   BILITOT 0.6   LIPASE 123.0     No results for input(s): TROPONINT in the last 72 hours. RADIOLOGY     XR ACUTE ABD SERIES CHEST 1 VW   Final Result   Stable right basilar atelectasis and suspected right effusion. No free air or bowel obstruction. Dense retained colonic stool. This document has been electronically signed by: Asad Paz MD on    10/27/2022 06:52 AM      XR ACUTE ABD SERIES CHEST 1 VW   Final Result   1. Mild atelectasis in the right lung base. 2. No residual distended small bowel loops. **This report has been created using voice recognition software. It may contain minor errors which are inherent in voice recognition technology. **      Final report electronically signed by  Surinder Felton on 10/26/2022 7:40 AM      XR ABDOMEN (KUB) (SINGLE AP VIEW)   Final Result   The nasogastric catheter extends below the hemidiaphragms with tip in the left upper quadrant in the projection of the stomach. Multiple dilated small bowel loops with little distal bowel gas again noted consistent with small bowel obstruction. **This report has been created using voice recognition software. It may contain minor errors which are inherent in voice recognition technology. **      Final report electronically signed by Dr London Hodges on 10/25/2022 12:25 PM      CT ABDOMEN PELVIS W IV CONTRAST Additional Contrast? None   Final Result   1. Multiple dilated small bowel loops with bowel wall enhancement and thickening transition left lower quadrant. Suspicious for small bowel obstruction with generalized enteritis. **This report has been created using voice recognition software. It may contain minor errors which are inherent in voice recognition technology. **      Final report electronically signed by Dr. Stephen Reid on 10/25/2022 10:50 AM            Electronically signed by Cyntha Habermann, DO on 10/28/2022 at 8:34 AM

## 2022-10-31 ENCOUNTER — TELEPHONE (OUTPATIENT)
Dept: FAMILY MEDICINE CLINIC | Age: 52
End: 2022-10-31

## 2022-10-31 NOTE — TELEPHONE ENCOUNTER
[facility] Transitions Initial Follow Up Call    Outreach made within 2 business days of discharge: Yes    Patient: Lonnie Arriola Patient : 1970   MRN: [id number]  Reason for Admission: small bowel obstruction     Discharge Date: 10/28/22       Spoke with: patient    Discharge department/facility: [facility]    TCM Interactive Patient Contact:  Was patient able to fill all prescriptions: No: no medications were given  Was patient instructed to bring all medications to the follow-up visit: Yes  Is patient taking all medications as directed in the discharge summary?  Yes  Does patient understand their discharge instructions: Yes  Does patient have questions or concerns that need addressed prior to 7-14 day follow up office visit: no    Scheduled appointment with PCP within 7-14 days    Follow Up  Future Appointments   Date Time Provider Zurdo Valdez   11/3/2022 11:00 AM Alcides Harper, APRN - CNP [facility] [place]   2022  9:30 AM DO MAYDA Burden Adv Surg [address] [facility]   2022 10:00 AM Alcides Harper, APRN - CNP [facility] - [place], [address] [place] [address] Fatou Stewart

## 2022-10-31 NOTE — TELEPHONE ENCOUNTER
[facility] [address] Transitions Initial Follow Up Call    Outreach made within 2 business days of discharge: Yes    Patient: Sam Orozco Patient : 1970   MRN: [id number]  Reason for Admission: small bowel obstruction     Discharge Date: 10/28/22       Spoke with: called patient- no answer- unable to leave a message     Discharge department/facility: [facility]    TCM Interactive Patient Contact:  Was patient able to fill all prescriptions:   Was patient instructed to bring all medications to the follow-up visit:   Is patient taking all medications as directed in the discharge summary?    Does patient understand their discharge instructions:   Does patient have questions or concerns that need addressed prior to 7-14 day follow up office visit:     Scheduled appointment with PCP within 7-14 days    Follow Up  Future Appointments   Date Time Provider Zurdo Valdez   11/3/2022 11:00 AM Elizabeth Halter, APRN - CNP [facility] [place]   2022  9:30 AM DO MAYDA Barnard Adv Surg [address]   2022 10:00 AM Elizabeth Halter, APRN - CNP [facility] - [place], [address] [place] [address] Fatou Stewart

## 2022-11-02 ENCOUNTER — OFFICE VISIT (OUTPATIENT)
Dept: FAMILY MEDICINE CLINIC | Age: 52
End: 2022-11-02

## 2022-11-02 VITALS
OXYGEN SATURATION: 96 % | BODY MASS INDEX: 31.53 KG/M2 | RESPIRATION RATE: 14 BRPM | HEART RATE: 85 BPM | SYSTOLIC BLOOD PRESSURE: 136 MMHG | HEIGHT: 66 IN | DIASTOLIC BLOOD PRESSURE: 88 MMHG | WEIGHT: 196.2 LBS

## 2022-11-02 DIAGNOSIS — K56.609 SMALL BOWEL OBSTRUCTION (HCC): ICD-10-CM

## 2022-11-02 DIAGNOSIS — K50.919 CROHN'S DISEASE WITH COMPLICATION, UNSPECIFIED GASTROINTESTINAL TRACT LOCATION (HCC): ICD-10-CM

## 2022-11-02 DIAGNOSIS — Z09 HOSPITAL DISCHARGE FOLLOW-UP: Primary | ICD-10-CM

## 2022-11-02 ASSESSMENT — ENCOUNTER SYMPTOMS: ABDOMINAL PAIN: 1

## 2022-11-02 NOTE — PROGRESS NOTES
Post-Discharge Transitional Care Follow Up      Savanna Escalante   YOB: 1970    Date of Office Visit:  11/2/2022  Date of Hospital Admission: 10/25/22  Date of Hospital Discharge: 10/28/22  Readmission Risk Score (high >=14%. Medium >=10%):Readmission Risk Score: 7      Care management risk score Rising risk (score 2-5) and Complex Care (Scores >=6): No Risk Score On File     Non face to face  following discharge, date last encounter closed (first attempt may have been earlier): 10/31/2022     Call initiated 2 business days of discharge: Yes     Hospital discharge follow-up  -     [address] [place] [address] Drive ([facility]) - Jen Dudley MD, Gastroenterology, [place]  Crohn's disease with complication, unspecified gastrointestinal tract location [facility])  Small bowel obstruction ([address]  Resolved. Patient was discharged on a regular diet, advance as tolerated. Patient reports she was feeling better but then in the last 24 hours she is having some generalized dull abdominal pain. KUB was done in the office which was unremarkable. Patient with no abdominal pain on exam at this time no concern for any acute abdomen. Patient is to notify her GI, she actually requested a referral to a different GI doctor Dr. Self Pert referral was placed. -     [facility] ([facility]) - Jen Dudley MD, Gastroenterology, [place]  -     XR ABDOMEN (KUB) (SINGLE AP VIEW); Future    Medical Decision Making: moderate complexity  Return in about 8 weeks (around 12/28/2022) for DM f/u . Subjective:     Hospital follow up   10/25 Camron started feeling bad, Tuesday started puking up stool. Inserted NG tube. Abdominal pain started up Friday after she ate more food. Has been eating crackers. IN last 24 hours has eaten crackers and pureed soup   Abdominal dull  after she eats. BM- last one yesterday afternoon at 2, was watery.            Inpatient course: Discharge summary reviewed- see chart. Interval history/Current status: stable    Patient Active Problem List   Diagnosis    Crohn's disease with complication ([facility] [address]    Anxiety    Type 2 diabetes mellitus without complication, without long-term current use of insulin ([facility] [address]    Irritable bowel syndrome with both constipation and diarrhea    Hypertension    HSV-2 (herpes simplex virus 2) infection    Small bowel obstruction ([facility] [address]       Medications listed as ordered at the time of discharge from hospital     Medication List            Accurate as of November 2, 2022 11:59 PM. If you have any questions, ask your nurse or doctor.                 CHANGE how you take these medications      metFORMIN 500 MG extended release tablet  Commonly known as: GLUCOPHAGE-XR  Take 2 tablets by mouth daily (with breakfast)  What changed: how much to take            CONTINUE taking these medications      [employer] Blood Glucose Starter w/Device Kit  Please give whatever brand device and strips covered by insurance     albuterol sulfate  (90 Base) MCG/ACT inhaler  Commonly known as: Proventil HFA  Inhale 2 puffs into the lungs every 6 hours as needed for Wheezing     amLODIPine 10 MG tablet  Commonly known as: NORVASC  Take 1 tablet by mouth daily     losartan 100 MG tablet  Commonly known as: COZAAR  Take 1 tablet by mouth daily     metoprolol tartrate 50 MG tablet  Commonly known as: LOPRESSOR  Take 1 tablet by mouth 2 times daily     ondansetron 4 MG disintegrating tablet  Commonly known as: Zofran ODT  Take 1 tablet by mouth every 8 hours as needed for Nausea or Vomiting     traZODone 100 MG tablet  Commonly known as: DESYREL  Take 1 tablet by mouth nightly as needed for Sleep     venlafaxine 37.5 MG extended release capsule  Commonly known as: Effexor XR  Take 3 capsules by mouth daily               Medications marked \"taking\" at this time  Outpatient Medications Marked as Taking for the 11/2/22 encounter (Office Visit) with Ricky Mccormick APRN - CNP   Medication Sig Dispense Refill    metoprolol tartrate (LOPRESSOR) 50 MG tablet Take 1 tablet by mouth 2 times daily 180 tablet 3    venlafaxine (EFFEXOR XR) 37.5 MG extended release capsule Take 3 capsules by mouth daily 270 capsule 3    traZODone (DESYREL) 100 MG tablet Take 1 tablet by mouth nightly as needed for Sleep 90 tablet 3    albuterol sulfate HFA (PROVENTIL HFA) 108 (90 Base) MCG/ACT inhaler Inhale 2 puffs into the lungs every 6 hours as needed for Wheezing 18 g 0    ondansetron (ZOFRAN ODT) 4 MG disintegrating tablet Take 1 tablet by mouth every 8 hours as needed for Nausea or Vomiting 20 tablet 1    metFORMIN (GLUCOPHAGE-XR) 500 MG extended release tablet Take 2 tablets by mouth daily (with breakfast) (Patient taking differently: Take 1,500 mg by mouth daily (with breakfast)) 180 tablet 3    amLODIPine (NORVASC) 10 MG tablet Take 1 tablet by mouth daily 90 tablet 3    losartan (COZAAR) 100 MG tablet Take 1 tablet by mouth daily 90 tablet 3    Blood Glucose Monitoring Suppl ([employer] BLOOD GLUCOSE STARTER) w/Device KIT Please give whatever brand device and strips covered by insurance 1 kit 0        Medications patient taking as of now reconciled against medications ordered at time of hospital discharge: Yes    Review of Systems   Constitutional:  Negative for appetite change, chills, fatigue and fever. HENT:  Negative for congestion, ear discharge, sinus pressure, tinnitus and voice change. Eyes:  Negative for pain, discharge, itching and visual disturbance. Respiratory:  Negative for cough, choking, chest tightness, shortness of breath and wheezing. Cardiovascular:  Negative for chest pain, palpitations and leg swelling. Gastrointestinal:  Positive for abdominal pain. Negative for abdominal distention, constipation, nausea and vomiting. Endocrine: Negative for cold intolerance and heat intolerance.    Genitourinary:  Negative for dysuria, hematuria, vaginal discharge and vaginal pain.   Musculoskeletal:  Negative for arthralgias, back pain, gait problem, neck pain and neck stiffness. Skin:  Negative for color change and rash. Neurological:  Negative for dizziness, syncope, speech difficulty, light-headedness, numbness and headaches. Psychiatric/Behavioral:  Negative for behavioral problems, confusion, self-injury and suicidal ideas. The patient is not nervous/anxious. Objective:    /88   Pulse 85   Resp 14   Ht 5' 6\" (1.676 m)   Wt 196 lb 3.2 oz (89 kg)   SpO2 96%   BMI 31.67 kg/m²   Physical Exam  Vitals and nursing note reviewed. Constitutional:       General: She is not in acute distress. Appearance: Normal appearance. She is well-developed. She is not ill-appearing or diaphoretic. HENT:      Head: Normocephalic and atraumatic. Right Ear: Tympanic membrane and external ear normal. Tympanic membrane is not injected or erythematous. Left Ear: Tympanic membrane and external ear normal. Tympanic membrane is not injected or erythematous. Nose: Nose normal.      Mouth/Throat:      Mouth: Mucous membranes are moist.      Pharynx: Oropharynx is clear. Uvula midline. No oropharyngeal exudate or posterior oropharyngeal erythema. Eyes:      General:         Right eye: No discharge. Left eye: No discharge. Conjunctiva/sclera: Conjunctivae normal.      Pupils: Pupils are equal, round, and reactive to light. Neck:      Thyroid: No thyromegaly. Trachea: Trachea normal.   Cardiovascular:      Rate and Rhythm: Normal rate and regular rhythm. Pulses: Normal pulses. Heart sounds: Normal heart sounds, S1 normal and S2 normal. No murmur heard. No friction rub. No gallop. Pulmonary:      Effort: Pulmonary effort is normal. No respiratory distress. Breath sounds: Normal breath sounds. No wheezing or rales. Chest:      Chest wall: No tenderness. Abdominal:      General: Bowel sounds are normal. There is no distension. Palpations: Abdomen is soft. There is no mass. Tenderness: There is no abdominal tenderness. There is no guarding or rebound. Musculoskeletal:         General: No swelling, tenderness, deformity or signs of injury. Normal range of motion. Cervical back: Full passive range of motion without pain, normal range of motion and neck supple. Lymphadenopathy:      Cervical: No cervical adenopathy. Skin:     General: Skin is warm and dry. Capillary Refill: Capillary refill takes less than 2 seconds. Neurological:      General: No focal deficit present. Mental Status: She is alert and oriented to person, place, and time. Deep Tendon Reflexes: Reflexes are normal and symmetric. Psychiatric:         Mood and Affect: Mood normal.         Behavior: Behavior normal.         Thought Content: Thought content normal.         Judgment: Judgment normal.       An electronic signature was used to authenticate this note.   --Faizan Ortiz, MAXWELL - CNP

## 2022-11-11 ENCOUNTER — OFFICE VISIT (OUTPATIENT)
Dept: SURGERY | Age: 52
End: 2022-11-11
Payer: COMMERCIAL

## 2022-11-11 VITALS
HEART RATE: 78 BPM | BODY MASS INDEX: 31.5 KG/M2 | OXYGEN SATURATION: 98 % | HEIGHT: 66 IN | WEIGHT: 196 LBS | RESPIRATION RATE: 18 BRPM | TEMPERATURE: 97.3 F | SYSTOLIC BLOOD PRESSURE: 110 MMHG | DIASTOLIC BLOOD PRESSURE: 60 MMHG

## 2022-11-11 DIAGNOSIS — K56.609 SMALL BOWEL OBSTRUCTION (HCC): Primary | ICD-10-CM

## 2022-11-11 DIAGNOSIS — K50.019 CROHN'S DISEASE OF SMALL INTESTINE WITH COMPLICATION (HCC): ICD-10-CM

## 2022-11-11 PROCEDURE — 99212 OFFICE O/P EST SF 10 MIN: CPT | Performed by: SURGERY

## 2022-11-11 PROCEDURE — 3074F SYST BP LT 130 MM HG: CPT | Performed by: SURGERY

## 2022-11-11 PROCEDURE — 3078F DIAST BP <80 MM HG: CPT | Performed by: SURGERY

## 2022-11-11 NOTE — PROGRESS NOTES
Murali Falcon. [facility], [address] [place] [address] [address]  [place] [address]  [phone]  Hospital Follow-up Evaluation in Office    Pt Name: Wicho Tom  Date of Birth 1970   Today's Date: 2022  Medical Record Number: [id number]  Referring Provider: No ref. provider found  Primary Care Provider: MAXWELL Velazquez - CNP  Chief Complaint   Patient presents with    Follow-Up from [facility] on 10/28/2022-Small bowel obstruction      ASSESSMENT       Diagnosis Orders   1. Small bowel obstruction (HCC)  CT ENTEROGRAPHY W CONTRAST      2. Crohn's disease of small intestine with complication ([address]  CT ENTEROGRAPHY W CONTRAST            PLANS       CT enterography ordered to further evaluate. Follow up: Return for After testing completed. Saray Degroot is a 47 y.o. female seen in the office as a hospital follow up. Pt was admitted to the hospital for a small bowel obstruction. This was managed medically. Since discharge, Her symptoms have waxed and waned. She is having bowel movements. She continues to have some bloating and upper abdominal fullness after eating.    Past Medical History  Past Medical History:   Diagnosis Date    Anxiety     Crohn's disease ([address]     Depression     Diverticulosis     Histoplasmosis     Hypertension     Irritable bowel syndrome with both constipation and diarrhea 3/5/2019    Type 2 diabetes mellitus without complication, without long-term current use of insulin ([address] 3/5/2019     Past Surgical History  Past Surgical History:   Procedure Laterality Date    APPENDECTOMY      BREAST REDUCTION SURGERY      CARPAL TUNNEL RELEASE Right      SECTION      x 2     CHOLECYSTECTOMY      COLONOSCOPY  2020    Dr. Kanika Nelson Bilateral     HYSTERECTOMY VAGINAL  2004    partial     Medications  Current Outpatient Medications   Medication Sig Dispense Refill    metoprolol tartrate (LOPRESSOR) 50 MG tablet Take 1 tablet by mouth 2 times daily 180 tablet 3    venlafaxine (EFFEXOR XR) 37.5 MG extended release capsule Take 3 capsules by mouth daily 270 capsule 3    traZODone (DESYREL) 100 MG tablet Take 1 tablet by mouth nightly as needed for Sleep 90 tablet 3    albuterol sulfate HFA (PROVENTIL HFA) 108 (90 Base) MCG/ACT inhaler Inhale 2 puffs into the lungs every 6 hours as needed for Wheezing 18 g 0    ondansetron (ZOFRAN ODT) 4 MG disintegrating tablet Take 1 tablet by mouth every 8 hours as needed for Nausea or Vomiting 20 tablet 1    Blood Glucose Monitoring Suppl ([employer] BLOOD GLUCOSE STARTER) w/Device KIT Please give whatever brand device and strips covered by insurance 1 kit 0    metFORMIN (GLUCOPHAGE-XR) 500 MG extended release tablet Take 2 tablets by mouth daily (with breakfast) (Patient taking differently: Take 1,500 mg by mouth daily (with breakfast)) 180 tablet 3    amLODIPine (NORVASC) 10 MG tablet Take 1 tablet by mouth daily 90 tablet 3    losartan (COZAAR) 100 MG tablet Take 1 tablet by mouth daily 90 tablet 3     No current facility-administered medications for this visit. Allergies  has No Known Allergies. Family History  family history includes Colon Cancer in her maternal grandfather; Early Death in her father; High Blood Pressure in her mother and sister; Other in her mother. Social History   reports that she has never smoked. She has never used smokeless tobacco. She reports that she does not drink alcohol and does not use drugs.   Health Screening Exams  Health Maintenance   Topic Date Due    Diabetic retinal exam  Never done    Hepatitis B vaccine (1 of 3 - Risk 3-dose series) Never done    Shingles vaccine (1 of 2) Never done    Flu vaccine (1) Never done    Diabetic foot exam  11/23/2022    Lipids  11/23/2022    Pneumococcal 0-64 years Vaccine (1 - PCV) 12/30/2022 (Originally 3/21/1976)    COVID-19 Vaccine (3 - Booster for Pfizer series) 12/30/2022 (Originally 12/3/2021)    A1C test (Diabetic or Prediabetic)  12/21/2022    Diabetic microalbuminuria test  06/17/2023    Depression Screen  06/17/2023    Breast cancer screen  08/09/2023    DTaP/Tdap/Td vaccine (2 - Td or Tdap) 08/20/2028    Colorectal Cancer Screen  03/05/2030    Hepatitis C screen  Completed    HIV screen  Completed    Hepatitis A vaccine  Aged Out    Hib vaccine  Aged Out    Meningococcal (ACWY) vaccine  Aged Out     Review of Systems  History obtained from the patient. Constitutional: Denies any fever, chills. Derm: Denies any rash or skin color change. Eyes: Denies blurred or decreased in vision. Ent: Denies any tinnitus or vertigo. Resp: Denies any cough or shortness of breath. CV: Denies any syncope, palpitations or chest pain. GI: + abdominal pain and bloating  : Denies any hematuria, hesitancy, or dysuria. Heme/Lymph: Denies any bleeding. Musculoskeletal: Denies any myalgias, muscle weakness or neck pain. Neuro: Denies any dizziness, paresthesia or weakness. OBJECTIVE    VITALS:  height is 5' 6\" (1.676 m) and weight is 196 lb (88.9 kg). Her temporal temperature is 97.3 °F (36.3 °C). Her blood pressure is 110/60 and her pulse is 78. Her respiration is 18 and oxygen saturation is 98%. Pain Score:   4  CONSTITUTIONAL: Alert and oriented times 3, no acute distress and cooperative to examination with proper mood and affect. SKIN: Skin color, texture, turgor normal. No rashes or lesions. LYMPH: no cervical nodes, no inguinal nodes  HEENT: Head is normocephalic, atraumatic. EOMI, PERRLA. NECK: Supple, symmetrical, trachea midline, no adenopathy, thyroid symmetric, not enlarged and no tenderness, skin normal.  CHEST/LUNGS: chest symmetric with normal A/P diameter, normal respiratory rate and rhythm, lungs clear to auscultation without wheezes, rales or rhonchi. No accessory muscle use.  Scars None   CARDIOVASCULAR: Heart sounds are normal.  Regular rate and rhythm without murmur, gallop or rub. Normal S1 and S2. Carotid and femoral pulses 2+/4 and equal bilaterally. ABDOMEN: Normal shape. Laparoscopic scar(s) present. Normal bowel sounds. No bruits. Soft, mild mid abdominal tenderness, nondistended, no masses or organomegaly. no evidence of hernia. Percussion: Normal without hepatosplenomegally. RECTAL: deferred, not clinically indicated  NEUROLOGIC: There are no focalizing motor or sensory deficits. CN II-XII are grossly intact. Tanda Bun EXTREMITIES: no cyanosis, no clubbing, and no edema. Thank you for the interesting evaluation. Further recommendations as listed above.        Electronically signed by Gifford Scheuermann, DO on 11/11/2022 at 9:40 AM

## 2022-11-11 NOTE — LETTER
Gisela Powers DO  [address] [facility]. [address]  [place] [address]  [phone]     Pt Name: Feliciano Leong  Medical Record Number: [id number]  Date of Birth 1970   Today's Date: 11/11/2022    Lynell Skiff was evaluated in the office today. My assessment and plans are listed below. ASSESSMENT      Diagnosis Orders   1. Small bowel obstruction (HCC)  CT ENTEROGRAPHY W CONTRAST      2. Crohn's disease of small intestine with complication ([address]  CT ENTEROGRAPHY W CONTRAST           PLANS:   CT enterography ordered to further evaluate. Follow up: Return for After testing completed. If I can provide any additional assistance or you have any concerns, please feel free to contact me. Thank you for allowing to participate in the care of your patients. Sincerely,      Meagan Comer.  Kang Vega

## 2022-11-13 ASSESSMENT — ENCOUNTER SYMPTOMS
ABDOMINAL DISTENTION: 0
EYE ITCHING: 0
COLOR CHANGE: 0
VOICE CHANGE: 0
COUGH: 0
BACK PAIN: 0
NAUSEA: 0
EYE DISCHARGE: 0
CHOKING: 0
VOMITING: 0
WHEEZING: 0
SHORTNESS OF BREATH: 0
EYE PAIN: 0
CONSTIPATION: 0
CHEST TIGHTNESS: 0
SINUS PRESSURE: 0

## 2022-11-21 NOTE — DISCHARGE SUMMARY
[facility] [address] SUMMARY  Pt Name: Lonnie Sutton  MRN: [id number]  YOB: 1970  Primary Care Physician: MAXWELL Rebolledo CNP  Admit date:  10/25/2022  9:37 AM  Discharge date:  10/28/2022 10:31 AM  Disposition: home  Condition at Discharge: Stable  Admitting Diagnosis:   1. Small bowel obstruction [facility])      Discharge Diagnosis:   Patient Active Problem List   Diagnosis Code    Crohn's disease with complication ([facility] S23.044    Anxiety F41.9    Type 2 diabetes mellitus without complication, without long-term current use of insulin ([facility] E11.9    Irritable bowel syndrome with both constipation and diarrhea K58.2    Hypertension I10    HSV-2 (herpes simplex virus 2) infection B00.9    Small bowel obstruction ([facility] K56.609     Consultants:  none  Procedures/Diagnostic Test:  medical management  Hospital Course: Noemi Nance originally presented to the hospital on 10/25/2022  9:37 AM with an SBO either related to Crohn's or adhesions. . She was admitted, NG placed and followed with serial labs and exams  10/26 - passing flatus, NG clamped trial of clear liquids. 10/27 - passing flatus, No BM. NG removed, diet advanced. At time of discharge 10/28, Noemi Nance was tolerating a low residue, having bowel movements,ambulating on her own accord and had adequate analgesia on oral pain medications, and had no signs of symptoms of complications. PHYSICAL EXAMINATION   Discharge Vitals:  height is 5' 6\" (1.676 m) and weight is 198 lb (89.8 kg). Her oral temperature is 98.3 °F (36.8 °C). Her blood pressure is 161/94 (abnormal) and her pulse is 79. Her respiration is 16 and oxygen saturation is 96%.    General appearance - alert, well appearing, and in no distress  Chest - clear to ausculation  Heart - normal rate and regular rhythm  Abdomen - soft, bowel sounds present  Neurological - motor and sensory grossly normal bilaterally  Musculoskeletal - full range of motion without pain  Extremities - peripheral pulses normal, no pedal edema, no clubbing or cyanosis  LABS     Recent Labs     10/28/22  0454   WBC 6.3   HGB 12.8   HCT 37.8         K 4.1      CO2 28   BUN 12   CREATININE 0.8     DISCHARGE INSTRUCTIONS   Discharge Medications:      Medication List        CHANGE how you take these medications      metFORMIN 500 MG extended release tablet  Commonly known as: GLUCOPHAGE-XR  Take 2 tablets by mouth daily (with breakfast)  What changed: how much to take            CONTINUE taking these medications      [employer] Blood Glucose Starter w/Device Kit  Please give whatever brand device and strips covered by insurance     albuterol sulfate  (90 Base) MCG/ACT inhaler  Commonly known as: Proventil HFA  Inhale 2 puffs into the lungs every 6 hours as needed for Wheezing     amLODIPine 10 MG tablet  Commonly known as: NORVASC  Take 1 tablet by mouth daily     losartan 100 MG tablet  Commonly known as: COZAAR  Take 1 tablet by mouth daily     metoprolol tartrate 50 MG tablet  Commonly known as: LOPRESSOR  Take 1 tablet by mouth 2 times daily     ondansetron 4 MG disintegrating tablet  Commonly known as: Zofran ODT  Take 1 tablet by mouth every 8 hours as needed for Nausea or Vomiting     traZODone 100 MG tablet  Commonly known as: DESYREL  Take 1 tablet by mouth nightly as needed for Sleep     venlafaxine 37.5 MG extended release capsule  Commonly known as: Effexor XR  Take 3 capsules by mouth daily            Diet: low residue diet as tolerated  Activity: activity as tolerated  Follow-up:  in the next few weeks with MAXWELL Rebolledo CNP, Follow up with Ebenezer Calloway DO in 2 weeks.   Time Spent for discharge: 20 minutes      Electronically signed by Ebenezer Calloway DO on 11/21/2022 at 11:24 AM

## 2022-11-25 ENCOUNTER — HOSPITAL ENCOUNTER (OUTPATIENT)
Dept: CT IMAGING | Age: 52
Discharge: HOME | End: 2022-11-25
Payer: COMMERCIAL

## 2022-11-25 DIAGNOSIS — K50.019 CROHN'S DISEASE OF SMALL INTESTINE WITH COMPLICATION (HCC): ICD-10-CM

## 2022-11-25 DIAGNOSIS — K56.609 SMALL BOWEL OBSTRUCTION (HCC): ICD-10-CM

## 2022-11-25 PROCEDURE — 74177 CT ABD & PELVIS W/CONTRAST: CPT

## 2022-11-25 PROCEDURE — 2500000003 HC RX 250 WO HCPCS: Performed by: SURGERY

## 2022-11-25 PROCEDURE — 6360000004 HC RX CONTRAST MEDICATION: Performed by: SURGERY

## 2022-11-25 RX ADMIN — IOPAMIDOL 100 ML: 755 INJECTION, SOLUTION INTRAVENOUS at 06:58

## 2022-11-25 RX ADMIN — BARIUM SULFATE 450 ML: 1 SUSPENSION ORAL at 05:30

## 2022-11-29 ENCOUNTER — OFFICE VISIT (OUTPATIENT)
Dept: SURGERY | Age: 52
End: 2022-11-29
Payer: COMMERCIAL

## 2022-11-29 VITALS
WEIGHT: 198.8 LBS | RESPIRATION RATE: 18 BRPM | BODY MASS INDEX: 31.95 KG/M2 | OXYGEN SATURATION: 98 % | TEMPERATURE: 97.2 F | DIASTOLIC BLOOD PRESSURE: 80 MMHG | SYSTOLIC BLOOD PRESSURE: 130 MMHG | HEART RATE: 72 BPM | HEIGHT: 66 IN

## 2022-11-29 DIAGNOSIS — K56.609 SMALL BOWEL OBSTRUCTION (HCC): Primary | ICD-10-CM

## 2022-11-29 DIAGNOSIS — R19.7 DIARRHEA, UNSPECIFIED TYPE: ICD-10-CM

## 2022-11-29 DIAGNOSIS — K50.90 CROHN'S DISEASE WITHOUT COMPLICATION, UNSPECIFIED GASTROINTESTINAL TRACT LOCATION (HCC): ICD-10-CM

## 2022-11-29 PROCEDURE — 99212 OFFICE O/P EST SF 10 MIN: CPT | Performed by: SURGERY

## 2022-11-29 PROCEDURE — 3078F DIAST BP <80 MM HG: CPT | Performed by: SURGERY

## 2022-11-29 PROCEDURE — 3074F SYST BP LT 130 MM HG: CPT | Performed by: SURGERY

## 2022-11-29 NOTE — PROGRESS NOTES
Burton Barlow. [place], [address] [facility] [address] [place] [address]  [phone]  Hospital Follow-up Evaluation in Office    Pt Name: Jose Roberto Baxter  Date of Birth 1970   Today's Date: 2022  Medical Record Number: [id number]  Referring Provider: No ref. provider found  Primary Care Provider: MAXWELL Green CNP  Chief Complaint   Patient presents with    Results     CT enterography 22     ASSESSMENT       Diagnosis Orders   1. Small bowel obstruction ([address]        2. Crohn's disease without complication, unspecified gastrointestinal tract location ([address]        3. Diarrhea, unspecified type                PLANS       At this time, recommend patient to increase her fiber intake to 40 g per day and spoke to her about fiber supplements. Follow up: Return for As needed. Instructed to call if any concerns. Duane Kendall is a 47 y.o. female seen in the office for follow up after CT enterography. Pt was admitted to the hospital for a small bowel obstruction in 2022. This was managed medically. Since discharge, Her symptoms have waxed and waned and notes some abdominal pain. She is having bowel movements but notes is still having diarrhea especially 20-45 minutes after eating.        Past Medical History  Past Medical History:   Diagnosis Date    Anxiety     Crohn's disease ([address]     Depression     Diverticulosis     Histoplasmosis     Hypertension     Irritable bowel syndrome with both constipation and diarrhea 3/5/2019    Type 2 diabetes mellitus without complication, without long-term current use of insulin ([address] 3/5/2019     Past Surgical History  Past Surgical History:   Procedure Laterality Date    APPENDECTOMY      BREAST REDUCTION SURGERY      CARPAL TUNNEL RELEASE Right      SECTION      x 2     CHOLECYSTECTOMY      COLONOSCOPY  2020    Dr. Albina James 2004    partial     Medications  Current Outpatient Medications   Medication Sig Dispense Refill    metoprolol tartrate (LOPRESSOR) 50 MG tablet Take 1 tablet by mouth 2 times daily 180 tablet 3    venlafaxine (EFFEXOR XR) 37.5 MG extended release capsule Take 3 capsules by mouth daily 270 capsule 3    traZODone (DESYREL) 100 MG tablet Take 1 tablet by mouth nightly as needed for Sleep 90 tablet 3    albuterol sulfate HFA (PROVENTIL HFA) 108 (90 Base) MCG/ACT inhaler Inhale 2 puffs into the lungs every 6 hours as needed for Wheezing 18 g 0    ondansetron (ZOFRAN ODT) 4 MG disintegrating tablet Take 1 tablet by mouth every 8 hours as needed for Nausea or Vomiting 20 tablet 1    metFORMIN (GLUCOPHAGE-XR) 500 MG extended release tablet Take 2 tablets by mouth daily (with breakfast) (Patient taking differently: Take 1,500 mg by mouth daily (with breakfast)) 180 tablet 3    amLODIPine (NORVASC) 10 MG tablet Take 1 tablet by mouth daily 90 tablet 3    losartan (COZAAR) 100 MG tablet Take 1 tablet by mouth daily 90 tablet 3    Blood Glucose Monitoring Suppl ([employer] BLOOD GLUCOSE STARTER) w/Device KIT Please give whatever brand device and strips covered by insurance 1 kit 0     No current facility-administered medications for this visit. Allergies  has No Known Allergies. Family History  family history includes Colon Cancer in her maternal grandfather; Early Death in her father; High Blood Pressure in her mother and sister; Other in her mother. Social History   reports that she has never smoked. She has never used smokeless tobacco. She reports that she does not drink alcohol and does not use drugs.   Health Screening Exams  Health Maintenance   Topic Date Due    Diabetic retinal exam  Never done    Hepatitis B vaccine (1 of 3 - Risk 3-dose series) Never done    Shingles vaccine (1 of 2) Never done    Flu vaccine (1) Never done    Diabetic foot exam  11/23/2022    Lipids  11/23/2022    A1C test (Diabetic or Prediabetic)  12/21/2022    Pneumococcal 0-64 years Vaccine (1 - PCV) 12/30/2022 (Originally 3/21/1976)    COVID-19 Vaccine (3 - Booster for Pfizer series) 12/30/2022 (Originally 12/3/2021)    Diabetic microalbuminuria test  06/17/2023    Depression Screen  06/17/2023    Breast cancer screen  08/09/2023    DTaP/Tdap/Td vaccine (2 - Td or Tdap) 08/20/2028    Colorectal Cancer Screen  03/05/2030    Hepatitis C screen  Completed    HIV screen  Completed    Hepatitis A vaccine  Aged Out    Hib vaccine  Aged Out    Meningococcal (ACWY) vaccine  Aged Out     Review of Systems  History obtained from the patient. Constitutional: Denies any fever, chills. Derm: Denies any rash or skin color change. Eyes: Denies blurred or decreased in vision. Ent: Denies any tinnitus or vertigo. Resp: Denies any cough or shortness of breath. CV: Denies any syncope, palpitations or chest pain. GI: + abdominal pain and bloating  : Denies any hematuria, hesitancy, or dysuria. Heme/Lymph: Denies any bleeding. Musculoskeletal: Denies any myalgias, muscle weakness or neck pain. Neuro: Denies any dizziness, paresthesia or weakness. OBJECTIVE    VITALS:  height is 5' 6\" (1.676 m) and weight is 198 lb 12.8 oz (90.2 kg). Her temporal temperature is 97.2 °F (36.2 °C). Her blood pressure is 130/80 and her pulse is 72. Her respiration is 18 and oxygen saturation is 98%. Pain Score:   0 - No pain  CONSTITUTIONAL: Alert and oriented times 3, no acute distress and cooperative to examination with proper mood and affect. SKIN: Skin color, texture, turgor normal. No rashes or lesions. LYMPH: no cervical nodes, no inguinal nodes  HEENT: Head is normocephalic, atraumatic. EOMI, PERRLA.   NECK: Supple, symmetrical, trachea midline, no adenopathy, thyroid symmetric, not enlarged and no tenderness, skin normal.  CHEST/LUNGS: chest symmetric with normal A/P diameter, normal respiratory rate and rhythm, lungs clear to auscultation without wheezes, rales or rhonchi. No accessory muscle use. Scars None   CARDIOVASCULAR: Heart sounds are normal.  Regular rate and rhythm without murmur, gallop or rub. Normal S1 and S2. Carotid and femoral pulses 2+/4 and equal bilaterally. ABDOMEN: Normal shape. Normal bowel sounds. No bruits. Soft, mild mid abdominal tenderness, nondistended, no masses or organomegaly. no evidence of hernia. Percussion: Normal without hepatosplenomegally. RECTAL: deferred, not clinically indicated  NEUROLOGIC: There are no focalizing motor or sensory deficits. CN II-XII are grossly intact. Lillian Leaf EXTREMITIES: no cyanosis, no clubbing, and no edema. Thank you for the interesting evaluation. Further recommendations as listed above.        Electronically signed by Maximiliano Ybarra DO on 11/29/2022 at 1:26 PM

## 2022-11-29 NOTE — LETTER
[facility]  [address] [address]  [place] [address] [phone]     Pt Name: Oneal Paredes  Medical Record Number: [id number]  Date of Birth 1970   Today's Date: 11/29/2022    Marissa Cano was evaluated in the office today. My assessment and plans are listed below. ASSESSMENT      Diagnosis Orders   1. Small bowel obstruction ([facility]        2. Crohn's disease without complication, unspecified gastrointestinal tract location ([facility]        3. Diarrhea, unspecified type               PLANS:   At this time, recommend patient to increase her fiber intake to 40 g per day and spoke to her about fiber supplements. Follow up: Return for As needed. Instructed to call if any concerns. If I can provide any additional assistance or you have any concerns, please feel free to contact me. Thank you for allowing to participate in the care of your patients. Sincerely,      Will Russell.  Kang Vega

## 2022-12-21 ENCOUNTER — OFFICE VISIT (OUTPATIENT)
Dept: FAMILY MEDICINE CLINIC | Age: 52
End: 2022-12-21
Payer: COMMERCIAL

## 2022-12-21 VITALS
TEMPERATURE: 98.3 F | HEART RATE: 83 BPM | RESPIRATION RATE: 16 BRPM | DIASTOLIC BLOOD PRESSURE: 90 MMHG | OXYGEN SATURATION: 97 % | SYSTOLIC BLOOD PRESSURE: 160 MMHG

## 2022-12-21 DIAGNOSIS — R11.0 NAUSEA: ICD-10-CM

## 2022-12-21 DIAGNOSIS — E11.9 TYPE 2 DIABETES MELLITUS WITHOUT COMPLICATION, WITHOUT LONG-TERM CURRENT USE OF INSULIN (HCC): Primary | ICD-10-CM

## 2022-12-21 DIAGNOSIS — I10 PRIMARY HYPERTENSION: ICD-10-CM

## 2022-12-21 DIAGNOSIS — J01.90 ACUTE BACTERIAL SINUSITIS: ICD-10-CM

## 2022-12-21 DIAGNOSIS — B96.89 ACUTE BACTERIAL SINUSITIS: ICD-10-CM

## 2022-12-21 LAB — HBA1C MFR BLD: 7.5 %

## 2022-12-21 PROCEDURE — 3074F SYST BP LT 130 MM HG: CPT | Performed by: NURSE PRACTITIONER

## 2022-12-21 PROCEDURE — 99214 OFFICE O/P EST MOD 30 MIN: CPT | Performed by: NURSE PRACTITIONER

## 2022-12-21 PROCEDURE — 83036 HEMOGLOBIN GLYCOSYLATED A1C: CPT | Performed by: NURSE PRACTITIONER

## 2022-12-21 PROCEDURE — 3078F DIAST BP <80 MM HG: CPT | Performed by: NURSE PRACTITIONER

## 2022-12-21 RX ORDER — ONDANSETRON 4 MG/1
4 TABLET, ORALLY DISINTEGRATING ORAL EVERY 8 HOURS PRN
Qty: 20 TABLET | Refills: 1 | Status: SHIPPED | OUTPATIENT
Start: 2022-12-21

## 2022-12-21 RX ORDER — AMOXICILLIN AND CLAVULANATE POTASSIUM 875; 125 MG/1; MG/1
1 TABLET, FILM COATED ORAL 2 TIMES DAILY
Qty: 14 TABLET | Refills: 0 | Status: SHIPPED | OUTPATIENT
Start: 2022-12-21 | End: 2022-12-28

## 2022-12-21 ASSESSMENT — ENCOUNTER SYMPTOMS
SINUS PAIN: 1
WHEEZING: 0
CHEST TIGHTNESS: 0
VOICE CHANGE: 0
SHORTNESS OF BREATH: 0
CHOKING: 0
SINUS PRESSURE: 1

## 2022-12-21 NOTE — PROGRESS NOTES
[address] [address]  Dept: [phone]  Loc: [phone]    Nancy Escalante (:  1970) is a 47 y.o. female,Established patient, here for evaluation of the following chief complaint(s):  Congestion (X 6 days ), Headache, and Medication Refill (Can only take metformin if take takes zofran- has been out of zofran for about 30 plus days- only has been taking metformin here and there )      ASSESSMENT/PLAN:  1. Type 2 diabetes mellitus without complication, without long-term current use of insulin ([facility])  A1c 7.5% previous 9.3%  Patient taking Ozempic, she had been taking 0.25 mg weekly been increased to 0.5 mg weekly. -     POCT glycosylated hemoglobin (Hb A1C)  -     Semaglutide,0.25 or 0.5MG/DOS, 2 MG/1.5ML SOPN; Inject 0.5 mg into the skin every 7 days, Disp-1 Adjustable Dose Pre-filled Pen Syringe, R-11Normal  2. Nausea  -     ondansetron (ZOFRAN ODT) 4 MG disintegrating tablet; Take 1 tablet by mouth every 8 hours as needed for Nausea or Vomiting, Disp-20 tablet, R-1Normal  3. Primary hypertension  156/88 today. Patient has not taken blood pressure medications today and has also taken over-the-counter decongestants because she is ill. Was instructed to take all prescribed blood pressure medications as soon as she gets home. Denies any headache chest pain or shortness of breath. 4. Acute bacterial sinusitis  -     amoxicillin-clavulanate (AUGMENTIN) 875-125 MG per tablet; Take 1 tablet by mouth 2 times daily for 7 days, Disp-14 tablet, R-0Normal  Patient nontoxic-appearing and in no acute distress. Exam consistent with bacterial sinusitis Augmentin prescribed    Health maintenance labs and vaccines declined at today's visit. Return in about 3 months (around 3/21/2023) for DM f/u .     SUBJECTIVE/OBJECTIVE:  Patient presents with:  Congestion: X 6 days   Headache  Medication Refill: Can only take metformin if take takes zofran- has been out of zofran for about 30 plus days- only has been taking metformin here and there       A1C 7.5%, previous 9.3%  Taking Ozempic     Last week started with sinus pain, now with a headache and sinus pressure behind eyes. OTC Cormaddisonn    Was taking    Last two weeks 0.5 mg        has a past medical history of Anxiety, Crohn's disease ([facility] Depression, Diverticulosis, Histoplasmosis, Hypertension, Irritable bowel syndrome with both constipation and diarrhea, and Type 2 diabetes mellitus without complication, without long-term current use of insulin ([facility] Review of Systems   Constitutional:  Positive for fatigue. Negative for appetite change, chills and fever. HENT:  Positive for congestion, sinus pressure and sinus pain. Negative for ear discharge, tinnitus and voice change. Respiratory:  Negative for choking, chest tightness, shortness of breath and wheezing. Gastrointestinal:  Positive for nausea (from metformin). Genitourinary: Negative. Musculoskeletal: Negative. Skin: Negative. Neurological:  Positive for headaches. Psychiatric/Behavioral: Negative. Physical Exam  Vitals and nursing note reviewed. Constitutional:       General: She is not in acute distress. Appearance: Normal appearance. She is well-developed. She is not ill-appearing or diaphoretic. HENT:      Head: Normocephalic and atraumatic. Right Ear: Tympanic membrane, ear canal and external ear normal. Tympanic membrane is not injected or erythematous. Left Ear: Tympanic membrane, ear canal and external ear normal. Tympanic membrane is not injected or erythematous. Nose: Congestion present. Right Sinus: Maxillary sinus tenderness and frontal sinus tenderness present. Left Sinus: Maxillary sinus tenderness and frontal sinus tenderness present. Mouth/Throat:      Mouth: Mucous membranes are moist.      Pharynx: Oropharynx is clear.  Uvula midline. Eyes:      General:         Right eye: No discharge. Left eye: No discharge. Conjunctiva/sclera: Conjunctivae normal.      Pupils: Pupils are equal, round, and reactive to light. Neck:      Thyroid: No thyromegaly. Trachea: Trachea normal.   Cardiovascular:      Rate and Rhythm: Normal rate and regular rhythm. Pulses: Normal pulses. Heart sounds: Normal heart sounds, S1 normal and S2 normal. No murmur heard. No friction rub. No gallop. Pulmonary:      Effort: Pulmonary effort is normal. No respiratory distress. Breath sounds: Normal breath sounds. No wheezing or rales. Chest:      Chest wall: No tenderness. Abdominal:      General: Bowel sounds are normal. There is no distension. Palpations: Abdomen is soft. There is no mass. Tenderness: There is no abdominal tenderness. There is no guarding or rebound. Musculoskeletal:         General: No tenderness or deformity. Normal range of motion. Cervical back: Full passive range of motion without pain, normal range of motion and neck supple. Lymphadenopathy:      Cervical: No cervical adenopathy. Skin:     General: Skin is warm and dry. Capillary Refill: Capillary refill takes less than 2 seconds. Neurological:      Mental Status: She is alert and oriented to person, place, and time. Deep Tendon Reflexes: Reflexes are normal and symmetric. Psychiatric:         Mood and Affect: Mood normal.         Behavior: Behavior normal.           An electronic signature was used to authenticate this note.     --Jefferson Car, MAXWELL - CNP

## 2023-01-04 ASSESSMENT — ENCOUNTER SYMPTOMS: NAUSEA: 1

## 2023-03-06 ENCOUNTER — TELEPHONE (OUTPATIENT)
Dept: FAMILY MEDICINE CLINIC | Age: 53
End: 2023-03-06

## 2023-03-06 DIAGNOSIS — J02.9 SORE THROAT: Primary | ICD-10-CM

## 2023-03-06 RX ORDER — AZITHROMYCIN 250 MG/1
250 TABLET, FILM COATED ORAL SEE ADMIN INSTRUCTIONS
Qty: 6 TABLET | Refills: 0 | Status: SHIPPED | OUTPATIENT
Start: 2023-03-06 | End: 2023-03-11

## 2023-03-06 NOTE — TELEPHONE ENCOUNTER
Let patient know I sent in a Alana Ferrell for her, the conjunctivitis that Laurey Dancer had was viral, so no antibiotic drop is needed.

## 2023-03-06 NOTE — TELEPHONE ENCOUNTER
Pt is calling, states she [facility], and states she now has a sore throat and pink eye. Asking if you can send a script into pharmacy. Please send to [facility] in [place].

## 2023-03-22 ENCOUNTER — OFFICE VISIT (OUTPATIENT)
Dept: FAMILY MEDICINE CLINIC | Age: 53
End: 2023-03-22

## 2023-03-22 ENCOUNTER — PATIENT MESSAGE (OUTPATIENT)
Dept: FAMILY MEDICINE CLINIC | Age: 53
End: 2023-03-22

## 2023-03-22 VITALS
OXYGEN SATURATION: 97 % | WEIGHT: 201.2 LBS | BODY MASS INDEX: 32.33 KG/M2 | HEIGHT: 66 IN | SYSTOLIC BLOOD PRESSURE: 122 MMHG | RESPIRATION RATE: 16 BRPM | DIASTOLIC BLOOD PRESSURE: 80 MMHG | HEART RATE: 90 BPM

## 2023-03-22 DIAGNOSIS — F41.9 ANXIETY: ICD-10-CM

## 2023-03-22 DIAGNOSIS — M25.50 POLYARTHRALGIA: ICD-10-CM

## 2023-03-22 DIAGNOSIS — M54.42 CHRONIC BILATERAL LOW BACK PAIN WITH BILATERAL SCIATICA: ICD-10-CM

## 2023-03-22 DIAGNOSIS — K50.019 CROHN'S DISEASE OF SMALL INTESTINE WITH COMPLICATION (HCC): ICD-10-CM

## 2023-03-22 DIAGNOSIS — K21.9 GASTROESOPHAGEAL REFLUX DISEASE, UNSPECIFIED WHETHER ESOPHAGITIS PRESENT: ICD-10-CM

## 2023-03-22 DIAGNOSIS — M54.41 CHRONIC BILATERAL LOW BACK PAIN WITH BILATERAL SCIATICA: ICD-10-CM

## 2023-03-22 DIAGNOSIS — K52.9 CHRONIC DIARRHEA: ICD-10-CM

## 2023-03-22 DIAGNOSIS — I10 ESSENTIAL HYPERTENSION: ICD-10-CM

## 2023-03-22 DIAGNOSIS — Z13.220 LIPID SCREENING: ICD-10-CM

## 2023-03-22 DIAGNOSIS — G89.29 CHRONIC BILATERAL LOW BACK PAIN WITH BILATERAL SCIATICA: ICD-10-CM

## 2023-03-22 DIAGNOSIS — E11.9 TYPE 2 DIABETES MELLITUS WITHOUT COMPLICATION, WITHOUT LONG-TERM CURRENT USE OF INSULIN (HCC): Primary | ICD-10-CM

## 2023-03-22 DIAGNOSIS — K58.2 IRRITABLE BOWEL SYNDROME WITH BOTH CONSTIPATION AND DIARRHEA: ICD-10-CM

## 2023-03-22 DIAGNOSIS — I10 PRIMARY HYPERTENSION: ICD-10-CM

## 2023-03-22 LAB
ERYTHROCYTE [SEDIMENTATION RATE] IN BLOOD BY WESTERGREN METHOD: 2 MM/HR (ref 0–20)
HBA1C MFR BLD: 8.8 %
RHEUMATOID FACT SERPL-ACNC: 11 IU/ML (ref 0–13)

## 2023-03-22 RX ORDER — METOPROLOL TARTRATE 50 MG/1
50 TABLET, FILM COATED ORAL 2 TIMES DAILY
Qty: 180 TABLET | Refills: 3 | Status: SHIPPED | OUTPATIENT
Start: 2023-03-22 | End: 2024-03-21

## 2023-03-22 RX ORDER — METFORMIN HYDROCHLORIDE 500 MG/1
1500 TABLET, EXTENDED RELEASE ORAL
Qty: 1080 TABLET | Refills: 3 | Status: SHIPPED | OUTPATIENT
Start: 2023-03-22 | End: 2024-03-21

## 2023-03-22 RX ORDER — OMEPRAZOLE 20 MG/1
20 CAPSULE, DELAYED RELEASE ORAL
Qty: 90 CAPSULE | Refills: 1 | Status: SHIPPED | OUTPATIENT
Start: 2023-03-22

## 2023-03-22 RX ORDER — ALPRAZOLAM 0.25 MG/1
0.25 TABLET ORAL DAILY PRN
Qty: 30 TABLET | Refills: 0 | Status: SHIPPED | OUTPATIENT
Start: 2023-03-22 | End: 2023-04-21

## 2023-03-22 RX ORDER — SEMAGLUTIDE 1.34 MG/ML
INJECTION, SOLUTION SUBCUTANEOUS
COMMUNITY
Start: 2023-02-21 | End: 2023-03-22 | Stop reason: ALTCHOICE

## 2023-03-22 RX ORDER — LOSARTAN POTASSIUM 100 MG/1
100 TABLET ORAL DAILY
Qty: 90 TABLET | Refills: 3 | Status: SHIPPED | OUTPATIENT
Start: 2023-03-22 | End: 2024-03-21

## 2023-03-22 ASSESSMENT — ENCOUNTER SYMPTOMS
VOMITING: 0
EYE ITCHING: 0
VOICE CHANGE: 0
CHOKING: 0
ABDOMINAL PAIN: 0
CHEST TIGHTNESS: 0
SINUS PRESSURE: 0
CONSTIPATION: 0
ABDOMINAL DISTENTION: 0
NAUSEA: 0
EYE DISCHARGE: 0
SHORTNESS OF BREATH: 0
COUGH: 0
WHEEZING: 0
COLOR CHANGE: 0
EYE PAIN: 0

## 2023-03-22 ASSESSMENT — PATIENT HEALTH QUESTIONNAIRE - PHQ9
2. FEELING DOWN, DEPRESSED OR HOPELESS: 0
SUM OF ALL RESPONSES TO PHQ QUESTIONS 1-9: 0
SUM OF ALL RESPONSES TO PHQ9 QUESTIONS 1 & 2: 0
1. LITTLE INTEREST OR PLEASURE IN DOING THINGS: 0
SUM OF ALL RESPONSES TO PHQ QUESTIONS 1-9: 0

## 2023-03-22 NOTE — TELEPHONE ENCOUNTER
From: Orin Escalante  To: [facility] Kellee  Sent: 3/22/2023 2:28 PM EDT  Subject: [facility]    Hey there. I just set up my account and they said that the new higher dose Ozempic script can be sent here. I attached my card. Thanks!  I didn't even know when had this program Whitley Hippo

## 2023-03-22 NOTE — PROGRESS NOTES
Venipuncture obtained from right arm. Patient tolerated the procedure without complications or complaints.
on the left side. Heart sounds: Normal heart sounds, S1 normal and S2 normal. No murmur heard. No friction rub. No gallop. Pulmonary:      Effort: Pulmonary effort is normal. No respiratory distress. Breath sounds: Normal breath sounds. No wheezing or rales. Chest:      Chest wall: No tenderness. Abdominal:      General: Bowel sounds are normal. There is no distension. Palpations: Abdomen is soft. There is no mass. Tenderness: There is no abdominal tenderness. There is no guarding or rebound. Musculoskeletal:         General: No deformity. Normal range of motion. Cervical back: Normal, full passive range of motion without pain, normal range of motion and neck supple. No rigidity or tenderness. Lumbar back: Tenderness present. No bony tenderness. Normal range of motion. No scoliosis. Feet:      Right foot:      Protective Sensation: 6 sites tested. 6 sites sensed. Skin integrity: Skin integrity normal.      Toenail Condition: Right toenails are normal.      Left foot:      Protective Sensation: 6 sites tested. 6 sites sensed. Skin integrity: Skin integrity normal.      Toenail Condition: Left toenails are normal.   Lymphadenopathy:      Cervical: No cervical adenopathy. Skin:     General: Skin is warm and dry. Capillary Refill: Capillary refill takes less than 2 seconds. Neurological:      Mental Status: She is alert and oriented to person, place, and time. Deep Tendon Reflexes: Reflexes are normal and symmetric. Psychiatric:         Mood and Affect: Mood normal.         Behavior: Behavior normal.           An electronic signature was used to authenticate this note.     --Eduard Ramirez, APRN - CNP

## 2023-03-23 DIAGNOSIS — G89.29 CHRONIC BILATERAL LOW BACK PAIN WITH BILATERAL SCIATICA: Primary | ICD-10-CM

## 2023-03-23 DIAGNOSIS — M81.0 OSTEOPOROSIS WITHOUT CURRENT PATHOLOGICAL FRACTURE, UNSPECIFIED OSTEOPOROSIS TYPE: ICD-10-CM

## 2023-03-23 DIAGNOSIS — M19.90 ARTHRITIS: ICD-10-CM

## 2023-03-23 DIAGNOSIS — M54.41 CHRONIC BILATERAL LOW BACK PAIN WITH BILATERAL SCIATICA: Primary | ICD-10-CM

## 2023-03-23 DIAGNOSIS — M54.42 CHRONIC BILATERAL LOW BACK PAIN WITH BILATERAL SCIATICA: Primary | ICD-10-CM

## 2023-03-23 LAB
CHOLESTEROL, FASTING: 187 MG/DL (ref 100–199)
CRP SERPL-MCNC: 0.62 MG/DL (ref 0–1)
HDLC SERPL-MCNC: 50 MG/DL
LDLC SERPL CALC-MCNC: 105 MG/DL
TRIGLYCERIDE, FASTING: 159 MG/DL (ref 0–199)

## 2023-03-23 RX ORDER — ANTACID TABLETS 648 MG/1
1 TABLET, CHEWABLE ORAL 2 TIMES DAILY
Qty: 180 TABLET | Refills: 3 | Status: SHIPPED | OUTPATIENT
Start: 2023-03-23 | End: 2024-03-22

## 2023-03-23 NOTE — RESULT ENCOUNTER NOTE
Please let Priscilla Sapp know that her inflammatory labs were all normal, I am awaiting the ASHLIE yet. Her cholesterol panel looked great! Her Xray showed evidence of arthritis and osteoporosis. I would like to have her start Physical therapy for her low back and hips. Also start a daily Calcium to help slow down the bone loss associated with osteoporosis. I will send the calcium in. Impression  1. No fracture seen. Minimal vertebral body spondylosis scattered lumbar spine. 2. Several mild endplate deformities, consistent with osteoporosis. Mild disc space narrowing L3-4.  Sacroiliac joints unremarkable

## 2023-03-28 LAB — NUCLEAR IGG SER QL IA: NORMAL

## 2023-03-28 NOTE — RESULT ENCOUNTER NOTE
ASHLIE normal.   Also XR of Lumbar spine  shows quite a bit of arthritis and possible osteoporosis. Is she taking any OTC calcium or bone support supplement?

## 2023-03-29 ENCOUNTER — TELEPHONE (OUTPATIENT)
Dept: FAMILY MEDICINE CLINIC | Age: 53
End: 2023-03-29

## 2023-03-29 NOTE — TELEPHONE ENCOUNTER
----- Message from MAXWELL Newton CNP sent at 3/28/2023  5:23 PM EDT -----  ASHLIE normal.   Also XR of Lumbar spine  shows quite a bit of arthritis and possible osteoporosis. Is she taking any OTC calcium or bone support supplement?

## 2023-03-29 NOTE — TELEPHONE ENCOUNTER
----- Message from MAXWELL Pickard - CNP sent at 3/23/2023  5:37 PM EDT -----  Please let Ajit Petit know that her inflammatory labs were all normal, I am awaiting the ASHLIE yet. Her cholesterol panel looked great! Her Xray showed evidence of arthritis and osteoporosis. I would like to have her start Physical therapy for her low back and hips. Also start a daily Calcium to help slow down the bone loss associated with osteoporosis. I will send the calcium in. Impression  1. No fracture seen. Minimal vertebral body spondylosis scattered lumbar spine. 2. Several mild endplate deformities, consistent with osteoporosis. Mild disc space narrowing L3-4.  Sacroiliac joints unremarkable

## 2023-03-31 ASSESSMENT — ENCOUNTER SYMPTOMS
BACK PAIN: 1
DIARRHEA: 1

## 2023-06-21 SDOH — ECONOMIC STABILITY: FOOD INSECURITY: WITHIN THE PAST 12 MONTHS, THE FOOD YOU BOUGHT JUST DIDN'T LAST AND YOU DIDN'T HAVE MONEY TO GET MORE.: NEVER TRUE

## 2023-06-21 SDOH — ECONOMIC STABILITY: FOOD INSECURITY: WITHIN THE PAST 12 MONTHS, YOU WORRIED THAT YOUR FOOD WOULD RUN OUT BEFORE YOU GOT MONEY TO BUY MORE.: NEVER TRUE

## 2023-06-21 SDOH — ECONOMIC STABILITY: INCOME INSECURITY: HOW HARD IS IT FOR YOU TO PAY FOR THE VERY BASICS LIKE FOOD, HOUSING, MEDICAL CARE, AND HEATING?: NOT HARD AT ALL

## 2023-06-21 SDOH — ECONOMIC STABILITY: HOUSING INSECURITY
IN THE LAST 12 MONTHS, WAS THERE A TIME WHEN YOU DID NOT HAVE A STEADY PLACE TO SLEEP OR SLEPT IN A SHELTER (INCLUDING NOW)?: NO

## 2023-06-21 SDOH — ECONOMIC STABILITY: TRANSPORTATION INSECURITY
IN THE PAST 12 MONTHS, HAS LACK OF TRANSPORTATION KEPT YOU FROM MEETINGS, WORK, OR FROM GETTING THINGS NEEDED FOR DAILY LIVING?: NO

## 2023-06-22 ENCOUNTER — OFFICE VISIT (OUTPATIENT)
Dept: FAMILY MEDICINE CLINIC | Age: 53
End: 2023-06-22
Payer: COMMERCIAL

## 2023-06-22 VITALS
OXYGEN SATURATION: 98 % | DIASTOLIC BLOOD PRESSURE: 88 MMHG | SYSTOLIC BLOOD PRESSURE: 138 MMHG | HEIGHT: 66 IN | RESPIRATION RATE: 18 BRPM | WEIGHT: 199.8 LBS | BODY MASS INDEX: 32.11 KG/M2 | HEART RATE: 88 BPM

## 2023-06-22 DIAGNOSIS — I10 ESSENTIAL HYPERTENSION: ICD-10-CM

## 2023-06-22 DIAGNOSIS — R11.0 NAUSEA: ICD-10-CM

## 2023-06-22 DIAGNOSIS — E11.9 TYPE 2 DIABETES MELLITUS WITHOUT COMPLICATION, WITHOUT LONG-TERM CURRENT USE OF INSULIN (HCC): Primary | ICD-10-CM

## 2023-06-22 DIAGNOSIS — F41.9 ANXIETY: ICD-10-CM

## 2023-06-22 LAB — HBA1C MFR BLD: 6.6 %

## 2023-06-22 PROCEDURE — 3052F HG A1C>EQUAL 8.0%<EQUAL 9.0%: CPT | Performed by: NURSE PRACTITIONER

## 2023-06-22 PROCEDURE — 3079F DIAST BP 80-89 MM HG: CPT | Performed by: NURSE PRACTITIONER

## 2023-06-22 PROCEDURE — 99214 OFFICE O/P EST MOD 30 MIN: CPT | Performed by: NURSE PRACTITIONER

## 2023-06-22 PROCEDURE — 83037 HB GLYCOSYLATED A1C HOME DEV: CPT | Performed by: NURSE PRACTITIONER

## 2023-06-22 PROCEDURE — 3075F SYST BP GE 130 - 139MM HG: CPT | Performed by: NURSE PRACTITIONER

## 2023-06-22 RX ORDER — SEMAGLUTIDE 2.68 MG/ML
2 INJECTION, SOLUTION SUBCUTANEOUS
Qty: 9 ML | Refills: 3 | Status: SHIPPED | OUTPATIENT
Start: 2023-06-22 | End: 2024-06-21

## 2023-06-22 RX ORDER — LANOLIN ALCOHOL/MO/W.PET/CERES
3 CREAM (GRAM) TOPICAL DAILY
COMMUNITY

## 2023-06-22 RX ORDER — SEMAGLUTIDE 1.34 MG/ML
INJECTION, SOLUTION SUBCUTANEOUS
COMMUNITY
Start: 2023-06-11 | End: 2023-06-22

## 2023-06-22 RX ORDER — VALACYCLOVIR HYDROCHLORIDE 500 MG/1
500 TABLET, FILM COATED ORAL 2 TIMES DAILY PRN
COMMUNITY
Start: 2023-05-08

## 2023-06-22 RX ORDER — BUSPIRONE HYDROCHLORIDE 5 MG/1
5 TABLET ORAL 3 TIMES DAILY
Qty: 270 TABLET | Refills: 3 | Status: SHIPPED | OUTPATIENT
Start: 2023-06-22 | End: 2024-06-21

## 2023-06-22 RX ORDER — AMLODIPINE BESYLATE 10 MG/1
10 TABLET ORAL DAILY
Qty: 90 TABLET | Refills: 3 | Status: SHIPPED | OUTPATIENT
Start: 2023-06-22 | End: 2024-06-21

## 2023-06-22 RX ORDER — ONDANSETRON 4 MG/1
4 TABLET, ORALLY DISINTEGRATING ORAL EVERY 8 HOURS PRN
Qty: 20 TABLET | Refills: 1 | Status: SHIPPED | OUTPATIENT
Start: 2023-06-22

## 2023-06-22 RX ORDER — TRAZODONE HYDROCHLORIDE 100 MG/1
TABLET ORAL
COMMUNITY
Start: 2023-06-18

## 2023-06-22 SDOH — ECONOMIC STABILITY: INCOME INSECURITY: HOW HARD IS IT FOR YOU TO PAY FOR THE VERY BASICS LIKE FOOD, HOUSING, MEDICAL CARE, AND HEATING?: NOT HARD AT ALL

## 2023-06-22 SDOH — ECONOMIC STABILITY: FOOD INSECURITY: WITHIN THE PAST 12 MONTHS, THE FOOD YOU BOUGHT JUST DIDN'T LAST AND YOU DIDN'T HAVE MONEY TO GET MORE.: NEVER TRUE

## 2023-06-22 SDOH — ECONOMIC STABILITY: FOOD INSECURITY: WITHIN THE PAST 12 MONTHS, YOU WORRIED THAT YOUR FOOD WOULD RUN OUT BEFORE YOU GOT MONEY TO BUY MORE.: NEVER TRUE

## 2023-06-22 ASSESSMENT — ENCOUNTER SYMPTOMS
ABDOMINAL PAIN: 0
SINUS PRESSURE: 0
NAUSEA: 1
COLOR CHANGE: 0
BACK PAIN: 0
VOMITING: 0
EYE ITCHING: 0
SHORTNESS OF BREATH: 0
CONSTIPATION: 0
COUGH: 0
ABDOMINAL DISTENTION: 0
VOICE CHANGE: 0
DIARRHEA: 0
CHOKING: 0
CHEST TIGHTNESS: 0
WHEEZING: 0
EYE DISCHARGE: 0
EYE PAIN: 0

## 2023-06-22 ASSESSMENT — ANXIETY QUESTIONNAIRES
GAD7 TOTAL SCORE: 15
1. FEELING NERVOUS, ANXIOUS, OR ON EDGE: 2
4. TROUBLE RELAXING: 2
6. BECOMING EASILY ANNOYED OR IRRITABLE: 3
7. FEELING AFRAID AS IF SOMETHING AWFUL MIGHT HAPPEN: 0
5. BEING SO RESTLESS THAT IT IS HARD TO SIT STILL: 2
2. NOT BEING ABLE TO STOP OR CONTROL WORRYING: 3
IF YOU CHECKED OFF ANY PROBLEMS ON THIS QUESTIONNAIRE, HOW DIFFICULT HAVE THESE PROBLEMS MADE IT FOR YOU TO DO YOUR WORK, TAKE CARE OF THINGS AT HOME, OR GET ALONG WITH OTHER PEOPLE: NOT DIFFICULT AT ALL
3. WORRYING TOO MUCH ABOUT DIFFERENT THINGS: 3

## 2023-06-22 NOTE — PATIENT INSTRUCTIONS
Cushion lab   Starting Sunday ok to stop Metformin d/t side effects, increase ozempic to 2 mg weekly

## 2023-06-22 NOTE — PROGRESS NOTES
Health Maintenance Due   Topic Date Due    Pneumococcal 0-64 years Vaccine (1 - PCV) Never done    Diabetic retinal exam  Never done    Hepatitis B vaccine (1 of 3 - Risk 3-dose series) Never done    Shingles vaccine (1 of 2) Never done    COVID-19 Vaccine (3 - Booster for Pfizer series) 12/03/2021    Diabetic Alb to Cr ratio (uACR) test  06/17/2023     Pap scheduled for 7/2023 with Karen Randall at [facility]  Had diabetic retinal exam in [facility] eye

## 2023-06-22 NOTE — PROGRESS NOTES
[address] [address]  Dept: [phone]  Loc: [phone]    Gisel Escalante (:  1970) is a 47 y.o. female,Established patient, here for evaluation of the following chief complaint(s):  Diabetes (Doing Ozempic, on 1mg for a while. Metformin continues, causing nausea) and Nausea (Needs more zofran, Metformin making her sick. )      ASSESSMENT/PLAN:  1. Type 2 diabetes mellitus without complication, without long-term current use of insulin ([facility])  A1C 6.6% previously 8.8%  Taking Ozempic took last on    Increaseing to 2 mg dose d/t side effects of metformin and stopping Metformin  -     POCT glycosylated hemoglobin (Hb A1C)    2. Primary hypertension  Controlled. 122/80  Continue taking   Lopressor 50 mg bid, losartan 100 mg daily, amlodipine 10 mg  Patient reminded and encouraged to make the necessary lifestyle modifications if appropriate: Weight loss, DASH diet, Reduce sodium, Increase physical activity, Moderate Alcohol consumption. 3. Nausea  Prn zofran, but hopefully with stopping metformin, this will improve, she has always struggles with side effects from metformin. 4.Anxiety  Agreeable to buspar 5 mg TID. Rx provided. Health maintenance labs and vaccines declined at today's visit. Return in about 3 months (around 2023) for DM . SUBJECTIVE/OBJECTIVE:      Patient presents with:  Diabetes: Doing Ozempic, on 1mg for a while. Metformin continues, causing nausea  Nausea: Needs more zofran, Metformin making her sick. has a past medical history of Anxiety, Crohn's disease ([address] Depression, Diverticulosis, Histoplasmosis, Hypertension, Irritable bowel syndrome with both constipation and diarrhea, and Type 2 diabetes mellitus without complication, without long-term current use of insulin ([address]     Review of Systems   Constitutional:  Negative for appetite

## 2023-07-17 RX ORDER — VALACYCLOVIR HYDROCHLORIDE 500 MG/1
TABLET, FILM COATED ORAL
Qty: 60 TABLET | Refills: 1 | Status: SHIPPED | OUTPATIENT
Start: 2023-07-17

## 2023-07-17 NOTE — TELEPHONE ENCOUNTER
Patient's last appointment was : 6/22/2023  Patient's next appointment is : 10/4/2023  Last refilled: (Historical Provider)

## 2023-10-04 ENCOUNTER — OFFICE VISIT (OUTPATIENT)
Dept: FAMILY MEDICINE CLINIC | Age: 53
End: 2023-10-04
Payer: COMMERCIAL

## 2023-10-04 VITALS
SYSTOLIC BLOOD PRESSURE: 144 MMHG | BODY MASS INDEX: 31.82 KG/M2 | WEIGHT: 198 LBS | RESPIRATION RATE: 16 BRPM | HEIGHT: 66 IN | OXYGEN SATURATION: 98 % | DIASTOLIC BLOOD PRESSURE: 102 MMHG | HEART RATE: 90 BPM

## 2023-10-04 DIAGNOSIS — R35.0 URINARY FREQUENCY: ICD-10-CM

## 2023-10-04 DIAGNOSIS — R23.2 HOT FLASHES: ICD-10-CM

## 2023-10-04 DIAGNOSIS — F41.9 ANXIETY: ICD-10-CM

## 2023-10-04 DIAGNOSIS — E11.9 TYPE 2 DIABETES MELLITUS WITHOUT COMPLICATION, WITHOUT LONG-TERM CURRENT USE OF INSULIN (HCC): ICD-10-CM

## 2023-10-04 DIAGNOSIS — I10 PRIMARY HYPERTENSION: ICD-10-CM

## 2023-10-04 DIAGNOSIS — B96.89 ACUTE BACTERIAL SINUSITIS: ICD-10-CM

## 2023-10-04 DIAGNOSIS — Z12.31 ENCOUNTER FOR SCREENING MAMMOGRAM FOR MALIGNANT NEOPLASM OF BREAST: ICD-10-CM

## 2023-10-04 DIAGNOSIS — E11.65 UNCONTROLLED TYPE 2 DIABETES MELLITUS WITH HYPERGLYCEMIA (HCC): Primary | ICD-10-CM

## 2023-10-04 DIAGNOSIS — B00.9 HSV-2 (HERPES SIMPLEX VIRUS 2) INFECTION: ICD-10-CM

## 2023-10-04 DIAGNOSIS — J01.90 ACUTE BACTERIAL SINUSITIS: ICD-10-CM

## 2023-10-04 LAB
BILIRUBIN, POC: NEGATIVE
BLOOD URINE, POC: NEGATIVE
CLARITY, POC: ABNORMAL
COLOR, POC: ABNORMAL
CREATININE URINE POCT: 200
GLUCOSE URINE, POC: 100
HBA1C MFR BLD: 8.1 %
KETONES, POC: NEGATIVE
LEUKOCYTE EST, POC: NEGATIVE
MICROALBUMIN/CREAT 24H UR: 30 MG/G{CREAT}
MICROALBUMIN/CREAT UR-RTO: <30
NITRITE, POC: NEGATIVE
PH, POC: 5.5
PROLACTIN SERPL-MCNC: 14 NG/ML
PROTEIN, POC: NEGATIVE
SPECIFIC GRAVITY, POC: >1.03
TSH SERPL DL<=0.005 MIU/L-ACNC: 2.01 UIU/ML (ref 0.4–4.2)
UROBILINOGEN, POC: 0.2

## 2023-10-04 PROCEDURE — 3074F SYST BP LT 130 MM HG: CPT | Performed by: NURSE PRACTITIONER

## 2023-10-04 PROCEDURE — 99214 OFFICE O/P EST MOD 30 MIN: CPT | Performed by: NURSE PRACTITIONER

## 2023-10-04 PROCEDURE — 81003 URINALYSIS AUTO W/O SCOPE: CPT | Performed by: NURSE PRACTITIONER

## 2023-10-04 PROCEDURE — 36415 COLL VENOUS BLD VENIPUNCTURE: CPT | Performed by: NURSE PRACTITIONER

## 2023-10-04 PROCEDURE — 3052F HG A1C>EQUAL 8.0%<EQUAL 9.0%: CPT | Performed by: NURSE PRACTITIONER

## 2023-10-04 PROCEDURE — 3078F DIAST BP <80 MM HG: CPT | Performed by: NURSE PRACTITIONER

## 2023-10-04 PROCEDURE — 83036 HEMOGLOBIN GLYCOSYLATED A1C: CPT | Performed by: NURSE PRACTITIONER

## 2023-10-04 PROCEDURE — 82044 UR ALBUMIN SEMIQUANTITATIVE: CPT | Performed by: NURSE PRACTITIONER

## 2023-10-04 RX ORDER — AMOXICILLIN AND CLAVULANATE POTASSIUM 875; 125 MG/1; MG/1
1 TABLET, FILM COATED ORAL 2 TIMES DAILY
Qty: 14 TABLET | Refills: 0 | Status: SHIPPED | OUTPATIENT
Start: 2023-10-04 | End: 2023-10-11

## 2023-10-04 RX ORDER — ALPRAZOLAM 0.25 MG/1
0.25 TABLET ORAL DAILY PRN
Qty: 30 TABLET | Refills: 0 | Status: SHIPPED | OUTPATIENT
Start: 2023-10-04 | End: 2023-11-03

## 2023-10-04 RX ORDER — TIRZEPATIDE 5 MG/.5ML
5 INJECTION, SOLUTION SUBCUTANEOUS WEEKLY
Qty: 2 ML | Refills: 0 | Status: SHIPPED | OUTPATIENT
Start: 2023-11-03 | End: 2023-12-03

## 2023-10-04 RX ORDER — ESTRADIOL 0.1 MG/G
CREAM VAGINAL
COMMUNITY
Start: 2023-07-12

## 2023-10-04 RX ORDER — VALACYCLOVIR HYDROCHLORIDE 500 MG/1
500 TABLET, FILM COATED ORAL 2 TIMES DAILY
Qty: 60 TABLET | Refills: 1 | Status: SHIPPED | OUTPATIENT
Start: 2023-10-04

## 2023-10-04 RX ORDER — TIRZEPATIDE 7.5 MG/.5ML
7.5 INJECTION, SOLUTION SUBCUTANEOUS WEEKLY
Qty: 2 ML | Refills: 0 | Status: SHIPPED | OUTPATIENT
Start: 2023-12-03 | End: 2024-01-02

## 2023-10-04 ASSESSMENT — ENCOUNTER SYMPTOMS
CHOKING: 0
DIARRHEA: 0
WHEEZING: 0
SINUS PRESSURE: 0
BACK PAIN: 0
CONSTIPATION: 0
EYE ITCHING: 0
ABDOMINAL PAIN: 0
SHORTNESS OF BREATH: 0
NAUSEA: 1
COLOR CHANGE: 0
CHEST TIGHTNESS: 0
VOICE CHANGE: 0
EYE DISCHARGE: 0
COUGH: 0
EYE PAIN: 0
VOMITING: 0
ABDOMINAL DISTENTION: 0

## 2023-10-04 NOTE — PROGRESS NOTES
[employer] [address] [address]  Dept: [phone]  Loc: [phone]    bAeba Escalante (:  1970) is a 47 y.o. female,Established patient, here for evaluation of the following chief complaint(s):  Diabetes (3 month follow up ) and Urinary Frequency (Gets up about 6 times a night - x 1.5 months )      ASSESSMENT/PLAN:  1. Type 2 diabetes mellitus without complication, without long-term current use of insulin ([address])  2. Uncontrolled type 2 DM with hyperglycemia. A1C 8.1%, previously  6.6% previously 8.8%  Taking Ozempic 2 mg   Changing to [facility]. 3. Primary hypertension  Not Controlled. 144/102  Has not taken her medications yet, reports she takes them at 10 am. Will taken them as soon as she gets home and call in 2 hours with reading. Denies any chest pain, shortness of breath or headache. Continue taking   Lopressor 50 mg bid, losartan 100 mg daily, amlodipine 10 mg  Patient reminded and encouraged to make the necessary lifestyle modifications if appropriate: Weight loss, DASH diet, Reduce sodium, Increase physical activity, Moderate Alcohol consumption. 4.Anxiety  Tried Buspar in , reports it was not helpful    Last Xanax Rx was in March and was for 30 tabs, has lasted until now. OARRS reviewed and no concerns. Xanax short supply of 30 tabs refilled. Continue Effexor .5 mg   Health maintenance labs and vaccines declined at today's visit. 5. Urinary Frequency   Checking UA today, may be due to increase glucose as well. 6.HSV-2   Stable. Refill for valtrex given     7. Hot Flashes  Patient with hx of hysterectomy, does have ovaries, follows with GYN  Checking hormone labs today     9. Breast cancer screening  Mammogram ordered.        PDMP Monitoring:    Last PDMP Jamie Bojorquez as Reviewed:  Review User Review Instant Review Result   Felipe Fail 10/4/2023  8:54 AM Reviewed

## 2023-10-04 NOTE — RESULT ENCOUNTER NOTE
Notify Adrienne Flores that her urine did not show any bacteria, but it did show glucose. Try to continue to reduce sugar intake and carbs.

## 2023-10-04 NOTE — PROGRESS NOTES
Health Maintenance Due   Topic Date Due    Hepatitis B vaccine (1 of 3 - 3-dose series) Never done- declined    Pneumococcal 0-64 years Vaccine (1 - PCV) Never done- declined    Diabetic retinal exam  Never done- [facility]     Shingles vaccine (1 of 2) Never done- declined    COVID-19 Vaccine (3 - Pfizer series) 12/03/2021- declined    Diabetic Alb to Cr ratio (uACR) test  06/17/2023- ordered today     Flu vaccine (1) Never done- declined    Breast cancer screen  08/09/2023- ordered     GFR test (Diabetes, CKD 3-4, OR last GFR 15-59)  10/27/2023     Called and requested eye exam report from [facility]- will be faxing report

## 2023-10-05 LAB
FOLLICLE STIMULATING HORMONE: 105 MIU/ML
LUTEINIZING HORMONE: 57.3 MIU/ML (ref 1.7–8.6)

## 2023-10-05 NOTE — RESULT ENCOUNTER NOTE
Notify Bhavesh Roman that her labs are consistent with menopausal/post menopausal.   Thyroid lab was normal.

## 2023-10-11 ENCOUNTER — TELEPHONE (OUTPATIENT)
Dept: FAMILY MEDICINE CLINIC | Age: 53
End: 2023-10-11

## 2023-10-11 NOTE — TELEPHONE ENCOUNTER
Patient calling to check status of mounjaro- she is leaving on a 15 day trip and was just checking before she left.      Advised that I would look into it and return call

## 2023-10-26 RX ORDER — TRAZODONE HYDROCHLORIDE 100 MG/1
100 TABLET ORAL NIGHTLY PRN
Qty: 90 TABLET | Refills: 3 | Status: SHIPPED | OUTPATIENT
Start: 2023-10-26

## 2023-10-26 NOTE — TELEPHONE ENCOUNTER
Patient's last appointment was : 10/4/2023  Patient's next appointment is : 1/10/2024  Last sent in: not found

## 2023-11-08 ENCOUNTER — OFFICE VISIT (OUTPATIENT)
Dept: FAMILY MEDICINE CLINIC | Age: 53
End: 2023-11-08
Payer: COMMERCIAL

## 2023-11-08 VITALS
DIASTOLIC BLOOD PRESSURE: 80 MMHG | BODY MASS INDEX: 31.98 KG/M2 | HEART RATE: 70 BPM | HEIGHT: 66 IN | OXYGEN SATURATION: 97 % | WEIGHT: 199 LBS | SYSTOLIC BLOOD PRESSURE: 124 MMHG | RESPIRATION RATE: 18 BRPM

## 2023-11-08 DIAGNOSIS — I10 PRIMARY HYPERTENSION: ICD-10-CM

## 2023-11-08 DIAGNOSIS — M25.542 ARTHRALGIA OF HANDS, BILATERAL: ICD-10-CM

## 2023-11-08 DIAGNOSIS — M25.50 POLYARTHRALGIA: ICD-10-CM

## 2023-11-08 DIAGNOSIS — M25.541 ARTHRALGIA OF HANDS, BILATERAL: ICD-10-CM

## 2023-11-08 DIAGNOSIS — K50.919 CROHN'S DISEASE WITH COMPLICATION, UNSPECIFIED GASTROINTESTINAL TRACT LOCATION (HCC): ICD-10-CM

## 2023-11-08 DIAGNOSIS — R93.89 ABNORMAL X-RAY: Primary | ICD-10-CM

## 2023-11-08 PROBLEM — K50.90 CROHN'S DISEASE (HCC): Status: ACTIVE | Noted: 2018-02-15

## 2023-11-08 LAB
ALBUMIN SERPL BCG-MCNC: 4.5 G/DL (ref 3.5–5.1)
ALP SERPL-CCNC: 122 U/L (ref 38–126)
ALT SERPL W/O P-5'-P-CCNC: 26 U/L (ref 11–66)
ANION GAP SERPL CALC-SCNC: 12 MEQ/L (ref 8–16)
AST SERPL-CCNC: 20 U/L (ref 5–40)
BASOPHILS ABSOLUTE: 0 THOU/MM3 (ref 0–0.1)
BASOPHILS NFR BLD AUTO: 0.5 %
BILIRUB SERPL-MCNC: 0.4 MG/DL (ref 0.3–1.2)
BUN SERPL-MCNC: 13 MG/DL (ref 7–22)
CALCIUM SERPL-MCNC: 9.7 MG/DL (ref 8.5–10.5)
CHLORIDE SERPL-SCNC: 104 MEQ/L (ref 98–111)
CO2 SERPL-SCNC: 26 MEQ/L (ref 23–33)
CREAT SERPL-MCNC: 0.8 MG/DL (ref 0.4–1.2)
CRP SERPL-MCNC: 0.45 MG/DL (ref 0–1)
DEPRECATED RDW RBC AUTO: 42.2 FL (ref 35–45)
EOSINOPHIL NFR BLD AUTO: 4.3 %
EOSINOPHILS ABSOLUTE: 0.3 THOU/MM3 (ref 0–0.4)
ERYTHROCYTE [DISTWIDTH] IN BLOOD BY AUTOMATED COUNT: 12.9 % (ref 11.5–14.5)
ERYTHROCYTE [SEDIMENTATION RATE] IN BLOOD BY WESTERGREN METHOD: 3 MM/HR (ref 0–20)
GFR SERPL CREATININE-BSD FRML MDRD: > 60 ML/MIN/1.73M2
GLUCOSE SERPL-MCNC: 111 MG/DL (ref 70–108)
HCT VFR BLD AUTO: 43.2 % (ref 37–47)
HGB BLD-MCNC: 13.9 GM/DL (ref 12–16)
IMM GRANULOCYTES # BLD AUTO: 0.02 THOU/MM3 (ref 0–0.07)
IMM GRANULOCYTES NFR BLD AUTO: 0.3 %
LYMPHOCYTES ABSOLUTE: 1.6 THOU/MM3 (ref 1–4.8)
LYMPHOCYTES NFR BLD AUTO: 25.6 %
MCH RBC QN AUTO: 28.8 PG (ref 26–33)
MCHC RBC AUTO-ENTMCNC: 32.2 GM/DL (ref 32.2–35.5)
MCV RBC AUTO: 89.6 FL (ref 81–99)
MONOCYTES ABSOLUTE: 0.4 THOU/MM3 (ref 0.4–1.3)
MONOCYTES NFR BLD AUTO: 7.1 %
NEUTROPHILS NFR BLD AUTO: 62.2 %
NRBC BLD AUTO-RTO: 0 /100 WBC
PLATELET # BLD AUTO: 274 THOU/MM3 (ref 130–400)
PMV BLD AUTO: 10.3 FL (ref 9.4–12.4)
POTASSIUM SERPL-SCNC: 4.5 MEQ/L (ref 3.5–5.2)
PROT SERPL-MCNC: 6.9 G/DL (ref 6.1–8)
RBC # BLD AUTO: 4.82 MILL/MM3 (ref 4.2–5.4)
SEGMENTED NEUTROPHILS ABSOLUTE COUNT: 3.9 THOU/MM3 (ref 1.8–7.7)
SODIUM SERPL-SCNC: 142 MEQ/L (ref 135–145)
WBC # BLD AUTO: 6.3 THOU/MM3 (ref 4.8–10.8)

## 2023-11-08 PROCEDURE — 99214 OFFICE O/P EST MOD 30 MIN: CPT | Performed by: NURSE PRACTITIONER

## 2023-11-08 PROCEDURE — 3079F DIAST BP 80-89 MM HG: CPT | Performed by: NURSE PRACTITIONER

## 2023-11-08 PROCEDURE — 36415 COLL VENOUS BLD VENIPUNCTURE: CPT | Performed by: NURSE PRACTITIONER

## 2023-11-08 PROCEDURE — 3074F SYST BP LT 130 MM HG: CPT | Performed by: NURSE PRACTITIONER

## 2023-11-08 RX ORDER — ANTACID TABLETS 648 MG/1
1 TABLET, CHEWABLE ORAL 2 TIMES DAILY
Qty: 180 TABLET | Refills: 3 | Status: SHIPPED | OUTPATIENT
Start: 2023-11-08 | End: 2024-11-07

## 2023-11-08 ASSESSMENT — ENCOUNTER SYMPTOMS
WHEEZING: 0
ABDOMINAL PAIN: 0
CHOKING: 0
VOMITING: 0
NAUSEA: 0
EYE PAIN: 0
ABDOMINAL DISTENTION: 0
EYE DISCHARGE: 0
CHEST TIGHTNESS: 0
SHORTNESS OF BREATH: 0
COLOR CHANGE: 0
CONSTIPATION: 0
VOICE CHANGE: 0
EYE ITCHING: 0
COUGH: 0
DIARRHEA: 1
SINUS PRESSURE: 0
BACK PAIN: 1

## 2023-11-08 NOTE — PROGRESS NOTES
Venipuncture obtained from right arm. Patient tolerated the procedure without complications or complaints.
appropriate: Weight loss, DASH diet, Reduce sodium, Increase physical activity, Moderate Alcohol consumption. Health maintenance labs and vaccines declined at today's visit. Return in about 1 week (around 11/15/2023) for Discuss results. SUBJECTIVE/OBJECTIVE:  Patient presents with:  Generalized Body Aches: Patient stated her whole body aches- started approximately 3 months ago. To the point it wakes her up at night. 3-4 months ago started noticing increased pain, knees, arms, shoulders hands. Tylenol, naproxen, aleve, ibuprofen. Helps a bit but comes back     Generalized Body Aches  Associated symptoms include arthralgias, myalgias and neck pain. Pertinent negatives include no abdominal pain, chest pain, chills, congestion, coughing, fatigue, fever, headaches, nausea, numbness, rash or vomiting. has a past medical history of Anxiety, Crohn's disease ([address]), Depression, Diverticulosis, Histoplasmosis, Hypertension, Irritable bowel syndrome with both constipation and diarrhea, and Type 2 diabetes mellitus without complication, without long-term current use of insulin ([address]). Review of Systems   Constitutional:  Negative for appetite change, chills, fatigue and fever. HENT:  Negative for congestion, ear discharge, sinus pressure, tinnitus and voice change. Eyes:  Negative for pain, discharge, itching and visual disturbance. Respiratory:  Negative for cough, choking, chest tightness, shortness of breath and wheezing. Cardiovascular:  Negative for chest pain, palpitations and leg swelling. Gastrointestinal:  Positive for diarrhea. Negative for abdominal distention, abdominal pain, constipation, nausea and vomiting. Endocrine: Negative for cold intolerance and heat intolerance. Genitourinary:  Negative for dysuria, hematuria, vaginal discharge and vaginal pain. Musculoskeletal:  Positive for arthralgias, back pain, myalgias and neck pain.  Negative for gait problem and neck

## 2023-11-09 ENCOUNTER — TELEPHONE (OUTPATIENT)
Dept: FAMILY MEDICINE CLINIC | Age: 53
End: 2023-11-09

## 2023-11-09 NOTE — TELEPHONE ENCOUNTER
Called patient, unable to leave vm. Wanted to inform patient Garrett Bradley forgot to give her the paperwork for the referral, but need to give patient the phone number to call and make appointment herself. [address]  [phone].

## 2023-11-16 ENCOUNTER — TELEPHONE (OUTPATIENT)
Dept: FAMILY MEDICINE CLINIC | Age: 53
End: 2023-11-16

## 2023-11-16 NOTE — TELEPHONE ENCOUNTER
----- Message from MAXWELL Ayala CNP sent at 11/16/2023 12:43 PM EST -----  Please let Og Fox know that her xrays did not show any fracture or dislocation, but did show an ossific density, this is consistent with osteoporosis. Did she schedule her Dexa Scan yet?  This is important in helping identify the severity of her osteoporosis and decide on what medication we can start her on to help with her pain

## 2023-12-05 ENCOUNTER — TELEPHONE (OUTPATIENT)
Dept: FAMILY MEDICINE CLINIC | Age: 53
End: 2023-12-05

## 2023-12-05 NOTE — TELEPHONE ENCOUNTER
I have you scheduled for your dexa scan on 1/4/24 @ 10:20     LIMA DEXA BONE DENSITY 2 SITES  * Arrive 15 minutes prior. * No calcium or multivitamins 24 hours prior. * Bring a current list of medication. * Please report to [address]  [facility] [address], [facility]

## 2024-01-09 DIAGNOSIS — B00.9 HSV-2 (HERPES SIMPLEX VIRUS 2) INFECTION: ICD-10-CM

## 2024-01-09 DIAGNOSIS — I10 ESSENTIAL HYPERTENSION: ICD-10-CM

## 2024-01-09 RX ORDER — METOPROLOL TARTRATE 50 MG/1
50 TABLET, FILM COATED ORAL 2 TIMES DAILY
Qty: 180 TABLET | Refills: 3 | Status: SHIPPED | OUTPATIENT
Start: 2024-01-09 | End: 2025-01-08

## 2024-01-09 RX ORDER — VALACYCLOVIR HYDROCHLORIDE 500 MG/1
500 TABLET, FILM COATED ORAL 2 TIMES DAILY
Qty: 60 TABLET | Refills: 1 | Status: SHIPPED | OUTPATIENT
Start: 2024-01-09

## 2024-01-09 NOTE — TELEPHONE ENCOUNTER
Patient's last appointment was : 11/8/2023  Patient's next appointment is : Visit date not found  Last refilled:  Metoprolol: 3/22/23, #180, 3 refill  Valacyclovir:10/4/23, #60, 1 refill

## 2024-01-26 ENCOUNTER — OFFICE VISIT (OUTPATIENT)
Dept: FAMILY MEDICINE CLINIC | Age: 54
End: 2024-01-26
Payer: COMMERCIAL

## 2024-01-26 VITALS
HEART RATE: 98 BPM | DIASTOLIC BLOOD PRESSURE: 84 MMHG | RESPIRATION RATE: 20 BRPM | SYSTOLIC BLOOD PRESSURE: 134 MMHG | TEMPERATURE: 97.2 F | BODY MASS INDEX: 31.47 KG/M2 | OXYGEN SATURATION: 95 % | WEIGHT: 195.8 LBS | HEIGHT: 66 IN

## 2024-01-26 DIAGNOSIS — J01.90 ACUTE BACTERIAL SINUSITIS: ICD-10-CM

## 2024-01-26 DIAGNOSIS — F41.9 ANXIETY: ICD-10-CM

## 2024-01-26 DIAGNOSIS — B96.89 ACUTE BACTERIAL SINUSITIS: ICD-10-CM

## 2024-01-26 DIAGNOSIS — I10 PRIMARY HYPERTENSION: ICD-10-CM

## 2024-01-26 DIAGNOSIS — E11.9 TYPE 2 DIABETES MELLITUS WITHOUT COMPLICATION, WITHOUT LONG-TERM CURRENT USE OF INSULIN (HCC): Primary | ICD-10-CM

## 2024-01-26 DIAGNOSIS — R11.0 NAUSEA: ICD-10-CM

## 2024-01-26 LAB — HBA1C MFR BLD: 7.2 %

## 2024-01-26 PROCEDURE — 3079F DIAST BP 80-89 MM HG: CPT | Performed by: NURSE PRACTITIONER

## 2024-01-26 PROCEDURE — 3075F SYST BP GE 130 - 139MM HG: CPT | Performed by: NURSE PRACTITIONER

## 2024-01-26 PROCEDURE — 83036 HEMOGLOBIN GLYCOSYLATED A1C: CPT | Performed by: NURSE PRACTITIONER

## 2024-01-26 PROCEDURE — 3051F HG A1C>EQUAL 7.0%<8.0%: CPT | Performed by: NURSE PRACTITIONER

## 2024-01-26 PROCEDURE — 99214 OFFICE O/P EST MOD 30 MIN: CPT | Performed by: NURSE PRACTITIONER

## 2024-01-26 RX ORDER — ONDANSETRON 4 MG/1
4 TABLET, FILM COATED ORAL 3 TIMES DAILY PRN
Qty: 20 TABLET | Refills: 1 | Status: SHIPPED | OUTPATIENT
Start: 2024-01-26

## 2024-01-26 RX ORDER — AMOXICILLIN AND CLAVULANATE POTASSIUM 875; 125 MG/1; MG/1
1 TABLET, FILM COATED ORAL 2 TIMES DAILY
Qty: 20 TABLET | Refills: 0 | Status: SHIPPED | OUTPATIENT
Start: 2024-01-26 | End: 2024-02-05

## 2024-01-26 RX ORDER — TIRZEPATIDE 7.5 MG/.5ML
7.5 INJECTION, SOLUTION SUBCUTANEOUS WEEKLY
Qty: 2 ML | Refills: 11 | Status: SHIPPED | OUTPATIENT
Start: 2024-01-26 | End: 2025-01-25

## 2024-01-26 ASSESSMENT — PATIENT HEALTH QUESTIONNAIRE - PHQ9
SUM OF ALL RESPONSES TO PHQ QUESTIONS 1-9: 0
2. FEELING DOWN, DEPRESSED OR HOPELESS: 0
SUM OF ALL RESPONSES TO PHQ QUESTIONS 1-9: 0
1. LITTLE INTEREST OR PLEASURE IN DOING THINGS: 0
SUM OF ALL RESPONSES TO PHQ QUESTIONS 1-9: 0
SUM OF ALL RESPONSES TO PHQ9 QUESTIONS 1 & 2: 0
SUM OF ALL RESPONSES TO PHQ QUESTIONS 1-9: 0

## 2024-01-26 NOTE — PATIENT INSTRUCTIONS
Verify [employer] brand.     Keep in 7.5 Mounjaro.       Double check venlafexine (Effexor) bottles you have.

## 2024-01-26 NOTE — PROGRESS NOTES
visit.        Return in about 3 months (around 4/26/2024) for DM f/u .    SUBJECTIVE/OBJECTIVE:  Patient presents with:  Headache  Congestion: Patient states she has had head congestion since 1/4. Patient states she had the flu for 2 weeks and got better and now has headache and green mucous/nasal drainage.   Diabetes: Last A1c 8.1 in October, will recheck today.   Follow-up: Follow up for Mounjaro. Did not take for 2 weeks due to vomiting x 1 week and diarrhea on 2nd week.              has a past medical history of Anxiety, Crohn's disease ([facility]), Depression, Diverticulosis, Histoplasmosis, Hypertension, Irritable bowel syndrome with both constipation and diarrhea, and Type 2 diabetes mellitus without complication, without long-term current use of insulin ([facility]).    Review of Systems   Constitutional:  Negative for appetite change, chills, fatigue and fever.   HENT:  Positive for congestion, sinus pressure and sinus pain. Negative for ear discharge, tinnitus and voice change.    Eyes:  Negative for pain, discharge, itching and visual disturbance.   Respiratory:  Negative for cough, choking, chest tightness, shortness of breath and wheezing.    Cardiovascular:  Negative for chest pain, palpitations and leg swelling.   Gastrointestinal:  Positive for nausea (occasionally d/t injectable.). Negative for abdominal distention, abdominal pain, constipation and vomiting.   Endocrine: Negative for cold intolerance and heat intolerance.   Genitourinary:  Negative for dysuria, hematuria, vaginal discharge and vaginal pain.   Musculoskeletal:  Negative for arthralgias, back pain, gait problem, neck pain and neck stiffness.   Skin:  Negative for color change and rash.   Neurological:  Negative for dizziness, syncope, speech difficulty, light-headedness, numbness and headaches.   Psychiatric/Behavioral:  Negative for behavioral problems, confusion, self-injury and suicidal ideas. The patient is not nervous/anxious.        Physical

## 2024-02-02 ASSESSMENT — ENCOUNTER SYMPTOMS
SINUS PRESSURE: 1
WHEEZING: 0
EYE DISCHARGE: 0
COLOR CHANGE: 0
EYE ITCHING: 0
CONSTIPATION: 0
NAUSEA: 1
SINUS PAIN: 1
ABDOMINAL DISTENTION: 0
VOICE CHANGE: 0
CHOKING: 0
VOMITING: 0
ABDOMINAL PAIN: 0
SHORTNESS OF BREATH: 0
CHEST TIGHTNESS: 0
BACK PAIN: 0
EYE PAIN: 0
COUGH: 0

## 2024-04-09 ENCOUNTER — PATIENT MESSAGE (OUTPATIENT)
Dept: FAMILY MEDICINE CLINIC | Age: 54
End: 2024-04-09

## 2024-04-09 DIAGNOSIS — E11.9 TYPE 2 DIABETES MELLITUS WITHOUT COMPLICATION, WITHOUT LONG-TERM CURRENT USE OF INSULIN (HCC): ICD-10-CM

## 2024-04-10 RX ORDER — TIRZEPATIDE 7.5 MG/.5ML
7.5 INJECTION, SOLUTION SUBCUTANEOUS WEEKLY
Qty: 6 ML | Refills: 3 | Status: SHIPPED | OUTPATIENT
Start: 2024-04-10 | End: 2025-04-10

## 2024-04-10 RX ORDER — TIRZEPATIDE 7.5 MG/.5ML
INJECTION, SOLUTION SUBCUTANEOUS
Refills: 0 | OUTPATIENT
Start: 2024-04-10

## 2024-04-10 NOTE — TELEPHONE ENCOUNTER
From: Rosa Escalante  To: Roselyn Rizzo  Sent: 4/9/2024 8:11 PM EDT  Subject: Jeannie Estrada there,    [facility] is having a heck of a time getting in Mounjaro. I went ahead a [facility] to [facility]. But for some reason it won't send the script from [facility] to [facility]. It told me to contact you. Thought I would send a msg while I was thinking of it.     Rosa

## 2024-06-06 DIAGNOSIS — I10 ESSENTIAL HYPERTENSION: ICD-10-CM

## 2024-06-07 RX ORDER — LOSARTAN POTASSIUM 100 MG/1
100 TABLET ORAL DAILY
Qty: 90 TABLET | Refills: 3 | Status: SHIPPED | OUTPATIENT
Start: 2024-06-07

## 2024-06-07 NOTE — TELEPHONE ENCOUNTER
Patient's last appointment was : 1/26/2024  Patient's next appointment is : Visit date not found  Last sent in: 3/22/23 90 tabs with 3 refills

## 2024-06-07 NOTE — TELEPHONE ENCOUNTER
Refill sent. Patient is DM and needs seen for management every 3 months, last OV 1/26/24.  Please call and schedule her for a DM ov .

## 2024-06-20 ENCOUNTER — OFFICE VISIT (OUTPATIENT)
Dept: FAMILY MEDICINE CLINIC | Age: 54
End: 2024-06-20
Payer: COMMERCIAL

## 2024-06-20 VITALS
SYSTOLIC BLOOD PRESSURE: 134 MMHG | OXYGEN SATURATION: 98 % | DIASTOLIC BLOOD PRESSURE: 82 MMHG | RESPIRATION RATE: 20 BRPM | WEIGHT: 200.4 LBS | BODY MASS INDEX: 32.21 KG/M2 | HEART RATE: 68 BPM | HEIGHT: 66 IN

## 2024-06-20 DIAGNOSIS — I10 ESSENTIAL HYPERTENSION: ICD-10-CM

## 2024-06-20 DIAGNOSIS — E11.9 TYPE 2 DIABETES MELLITUS WITHOUT COMPLICATION, WITHOUT LONG-TERM CURRENT USE OF INSULIN (HCC): Primary | ICD-10-CM

## 2024-06-20 DIAGNOSIS — I10 PRIMARY HYPERTENSION: ICD-10-CM

## 2024-06-20 DIAGNOSIS — F41.9 ANXIETY: ICD-10-CM

## 2024-06-20 DIAGNOSIS — K58.0 IRRITABLE BOWEL SYNDROME WITH DIARRHEA: ICD-10-CM

## 2024-06-20 LAB — HBA1C MFR BLD: 7.8 %

## 2024-06-20 PROCEDURE — 83036 HEMOGLOBIN GLYCOSYLATED A1C: CPT | Performed by: NURSE PRACTITIONER

## 2024-06-20 PROCEDURE — 3079F DIAST BP 80-89 MM HG: CPT | Performed by: NURSE PRACTITIONER

## 2024-06-20 PROCEDURE — 3051F HG A1C>EQUAL 7.0%<8.0%: CPT | Performed by: NURSE PRACTITIONER

## 2024-06-20 PROCEDURE — 99215 OFFICE O/P EST HI 40 MIN: CPT | Performed by: NURSE PRACTITIONER

## 2024-06-20 PROCEDURE — 3075F SYST BP GE 130 - 139MM HG: CPT | Performed by: NURSE PRACTITIONER

## 2024-06-20 RX ORDER — BUSPIRONE HYDROCHLORIDE 10 MG/1
10 TABLET ORAL 3 TIMES DAILY
Qty: 270 TABLET | Refills: 3 | Status: SHIPPED | OUTPATIENT
Start: 2024-06-20 | End: 2024-06-20 | Stop reason: SDUPTHER

## 2024-06-20 RX ORDER — BUSPIRONE HYDROCHLORIDE 10 MG/1
10 TABLET ORAL 3 TIMES DAILY
Qty: 270 TABLET | Refills: 3 | Status: SHIPPED | OUTPATIENT
Start: 2024-06-20 | End: 2025-06-20

## 2024-06-20 NOTE — PROGRESS NOTES
Health Maintenance Due   Topic Date Due    Hepatitis B vaccine (1 of 3 - 3-dose series) Never done    Pneumococcal 0-64 years Vaccine (1 of 2 - PCV) Never done    Shingles vaccine (1 of 2) Never done    Breast cancer screen  08/09/2023    COVID-19 Vaccine (3 - 2023-24 season) 09/01/2023    Diabetic foot exam  03/22/2024    Lipids  03/22/2024    Diabetic retinal exam  05/04/2024        
Reflexes: Reflexes are normal and symmetric.   Psychiatric:         Mood and Affect: Mood normal.         Behavior: Behavior normal.         Thought Content: Thought content normal.         Judgment: Judgment normal.             An electronic signature was used to authenticate this note.    --MAXWELL White - CNP

## 2024-06-30 RX ORDER — AMLODIPINE BESYLATE 10 MG/1
10 TABLET ORAL DAILY
Qty: 90 TABLET | Refills: 3 | Status: SHIPPED | OUTPATIENT
Start: 2024-06-30 | End: 2025-06-30

## 2024-07-21 DIAGNOSIS — I10 ESSENTIAL HYPERTENSION: ICD-10-CM

## 2024-07-22 RX ORDER — AMLODIPINE BESYLATE 10 MG/1
10 TABLET ORAL DAILY
Qty: 90 TABLET | Refills: 3 | OUTPATIENT
Start: 2024-07-22

## 2024-07-22 NOTE — TELEPHONE ENCOUNTER
LM on VM for patient to call back. Need to see if she wants the amlodipine sent to [facility] in [place]? Script already sent [facility] the [facility] on 06/30/2024 with 90 tabs ad 3 refills.

## 2024-09-03 DIAGNOSIS — I10 ESSENTIAL HYPERTENSION: ICD-10-CM

## 2024-09-04 RX ORDER — LOSARTAN POTASSIUM 100 MG/1
100 TABLET ORAL DAILY
Qty: 90 TABLET | Refills: 3 | Status: SHIPPED | OUTPATIENT
Start: 2024-09-04 | End: 2025-09-04

## 2024-09-04 NOTE — TELEPHONE ENCOUNTER
Patient's last appointment was : 6/20/2024  Patient's next appointment is : 10/2/2024  Last refilled: 06/07/24 90 tabs, 3 refill to [facility]

## 2024-10-04 ENCOUNTER — OFFICE VISIT (OUTPATIENT)
Dept: FAMILY MEDICINE CLINIC | Age: 54
End: 2024-10-04
Payer: COMMERCIAL

## 2024-10-04 VITALS
RESPIRATION RATE: 16 BRPM | SYSTOLIC BLOOD PRESSURE: 124 MMHG | HEIGHT: 66 IN | BODY MASS INDEX: 30.98 KG/M2 | DIASTOLIC BLOOD PRESSURE: 88 MMHG | HEART RATE: 80 BPM | WEIGHT: 192.8 LBS | OXYGEN SATURATION: 95 %

## 2024-10-04 DIAGNOSIS — E11.9 TYPE 2 DIABETES MELLITUS WITHOUT COMPLICATION, WITHOUT LONG-TERM CURRENT USE OF INSULIN (HCC): Primary | ICD-10-CM

## 2024-10-04 DIAGNOSIS — Z13.220 LIPID SCREENING: ICD-10-CM

## 2024-10-04 DIAGNOSIS — F41.9 ANXIETY: ICD-10-CM

## 2024-10-04 DIAGNOSIS — B00.9 HSV-2 (HERPES SIMPLEX VIRUS 2) INFECTION: ICD-10-CM

## 2024-10-04 DIAGNOSIS — I10 PRIMARY HYPERTENSION: ICD-10-CM

## 2024-10-04 DIAGNOSIS — Z12.31 ENCOUNTER FOR SCREENING MAMMOGRAM FOR MALIGNANT NEOPLASM OF BREAST: ICD-10-CM

## 2024-10-04 LAB
CHOLESTEROL, FASTING: 174 MG/DL (ref 100–199)
CREATININE URINE POCT: 300
HBA1C MFR BLD: 6.3 %
HDLC SERPL-MCNC: 45 MG/DL
LDLC SERPL CALC-MCNC: 104 MG/DL
MICROALBUMIN/CREAT 24H UR: 30 MG/DL
MICROALBUMIN/CREAT UR-RTO: <30 MG/G
TRIGLYCERIDE, FASTING: 126 MG/DL (ref 0–199)

## 2024-10-04 PROCEDURE — 3079F DIAST BP 80-89 MM HG: CPT | Performed by: NURSE PRACTITIONER

## 2024-10-04 PROCEDURE — 99215 OFFICE O/P EST HI 40 MIN: CPT | Performed by: NURSE PRACTITIONER

## 2024-10-04 PROCEDURE — 82044 UR ALBUMIN SEMIQUANTITATIVE: CPT | Performed by: NURSE PRACTITIONER

## 2024-10-04 PROCEDURE — 83036 HEMOGLOBIN GLYCOSYLATED A1C: CPT | Performed by: NURSE PRACTITIONER

## 2024-10-04 PROCEDURE — 3044F HG A1C LEVEL LT 7.0%: CPT | Performed by: NURSE PRACTITIONER

## 2024-10-04 PROCEDURE — 3074F SYST BP LT 130 MM HG: CPT | Performed by: NURSE PRACTITIONER

## 2024-10-04 RX ORDER — VALACYCLOVIR HYDROCHLORIDE 500 MG/1
500 TABLET, FILM COATED ORAL 2 TIMES DAILY
Qty: 60 TABLET | Refills: 3 | Status: SHIPPED | OUTPATIENT
Start: 2024-10-04

## 2024-10-04 RX ORDER — VENLAFAXINE HYDROCHLORIDE 75 MG/1
75 CAPSULE, EXTENDED RELEASE ORAL DAILY
Qty: 90 CAPSULE | Refills: 3 | Status: SHIPPED | OUTPATIENT
Start: 2024-10-04 | End: 2025-10-04

## 2024-10-04 SDOH — ECONOMIC STABILITY: FOOD INSECURITY: WITHIN THE PAST 12 MONTHS, THE FOOD YOU BOUGHT JUST DIDN'T LAST AND YOU DIDN'T HAVE MONEY TO GET MORE.: NEVER TRUE

## 2024-10-04 SDOH — ECONOMIC STABILITY: FOOD INSECURITY: WITHIN THE PAST 12 MONTHS, YOU WORRIED THAT YOUR FOOD WOULD RUN OUT BEFORE YOU GOT MONEY TO BUY MORE.: NEVER TRUE

## 2024-10-04 ASSESSMENT — ENCOUNTER SYMPTOMS
ABDOMINAL PAIN: 0
VOICE CHANGE: 0
SINUS PRESSURE: 0
SHORTNESS OF BREATH: 0
ABDOMINAL DISTENTION: 0
EYE PAIN: 0
COLOR CHANGE: 0
CHEST TIGHTNESS: 0
EYE DISCHARGE: 0
COUGH: 0
CONSTIPATION: 0
EYE ITCHING: 0
VOMITING: 0
BACK PAIN: 0
NAUSEA: 0
WHEEZING: 0
CHOKING: 0

## 2024-10-04 ASSESSMENT — ANXIETY QUESTIONNAIRES
4. TROUBLE RELAXING: NEARLY EVERY DAY
GAD7 TOTAL SCORE: 17
5. BEING SO RESTLESS THAT IT IS HARD TO SIT STILL: NEARLY EVERY DAY
2. NOT BEING ABLE TO STOP OR CONTROL WORRYING: NEARLY EVERY DAY
3. WORRYING TOO MUCH ABOUT DIFFERENT THINGS: MORE THAN HALF THE DAYS
IF YOU CHECKED OFF ANY PROBLEMS ON THIS QUESTIONNAIRE, HOW DIFFICULT HAVE THESE PROBLEMS MADE IT FOR YOU TO DO YOUR WORK, TAKE CARE OF THINGS AT HOME, OR GET ALONG WITH OTHER PEOPLE: EXTREMELY DIFFICULT
6. BECOMING EASILY ANNOYED OR IRRITABLE: NEARLY EVERY DAY
1. FEELING NERVOUS, ANXIOUS, OR ON EDGE: NEARLY EVERY DAY
7. FEELING AFRAID AS IF SOMETHING AWFUL MIGHT HAPPEN: NOT AT ALL

## 2024-10-04 NOTE — PROGRESS NOTES
Health Maintenance Due   Topic Date Due    Pneumococcal 0-64 years Vaccine (1 of 2 - PCV) Never done    Hepatitis B vaccine (1 of 3 - 19+ 3-dose series) Never done    Shingles vaccine (1 of 2) Never done    Breast cancer screen  08/09/2023    Diabetic foot exam  03/22/2024    Lipids  03/22/2024    Diabetic retinal exam  05/04/2024    Flu vaccine (1) Never done    COVID-19 Vaccine (3 - 2023-24 season) 09/01/2024    Diabetic Alb to Cr ratio (uACR) test  10/04/2024        [facility], diabetic exam.   Mammogram at [facility]     Patient decline flu vaccination at this time.

## 2024-10-04 NOTE — PROGRESS NOTES
[facility]  [address] [address]  Dept: [phone]  Loc: [phone]    Rosa Escalante (:  1970) is a 54 y.o. female,Established patient, here for evaluation of the following chief complaint(s):  Diabetes (Patient presents for a 3 month f/u for DM. Last A1C = 7.8% on 2024./Patient reports increased anxiety and panic attacks over the past few weeks. 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 She does not feel that the Buspirone is working. /Patient is fasting, and has not eaten in 12 plus hours.)      ASSESSMENT/PLAN:  1. Type 2 diabetes mellitus without complication, without long-term current use of insulin ([facility])  Hemoglobin A1C   Date Value Ref Range Status   10/04/2024 6.3 % Final     Previously 7.8%, continue on Mounjaro 10 mg q week.     -     POCT glycosylated hemoglobin (Hb A1C)  -      DIABETES FOOT EXAM  -     POCT microalbumin  2. HSV-2 (herpes simplex virus 2) infection  -     valACYclovir (VALTREX) 500 MG tablet; Take 1 tablet by mouth 2 times daily, Disp-60 tablet, R-3Normal  3. Anxiety  GAD7-17  Buspar 10 mg TID  Agreeable to resume Effexor, this was effective in the past. Last year she was taking 112 mg daily.   F/u in 4 weeks regarding mood     -     venlafaxine (EFFEXOR XR) 75 MG extended release capsule; Take 1 capsule by mouth daily, Disp-90 capsule, R-3Normal  4. Primary hypertension  Controlled. 124/88  Continue losartan 100, lopressor 50 BID, amlodipine 10 mg daily  Patient reminded and encouraged to make the necessary lifestyle modifications if appropriate: Weight loss, DASH diet, Reduce sodium, Increase physical activity, Moderate Alcohol consumption.       5. Encounter for screening mammogram for malignant neoplasm of breast  -     COLT DIGITAL SCREEN W OR WO CAD BILATERAL; Future  6. Lipid screening  -     Lipid, Fasting; Future      Greater than 45 minutes

## 2024-10-07 ENCOUNTER — TELEPHONE (OUTPATIENT)
Dept: FAMILY MEDICINE CLINIC | Age: 54
End: 2024-10-07

## 2024-10-07 DIAGNOSIS — B00.9 HSV-2 (HERPES SIMPLEX VIRUS 2) INFECTION: ICD-10-CM

## 2024-10-07 NOTE — TELEPHONE ENCOUNTER
----- Message from MAXWELL Wells CNP sent at 10/7/2024  6:49 AM EDT -----  Lipid panel looks great!

## 2024-10-08 RX ORDER — VALACYCLOVIR HYDROCHLORIDE 500 MG/1
500 TABLET, FILM COATED ORAL DAILY
Qty: 90 TABLET | Refills: 3 | Status: SHIPPED | OUTPATIENT
Start: 2024-10-08

## 2025-01-03 ENCOUNTER — TELEPHONE (OUTPATIENT)
Dept: FAMILY MEDICINE CLINIC | Age: 55
End: 2025-01-03

## 2025-01-07 ENCOUNTER — OFFICE VISIT (OUTPATIENT)
Dept: FAMILY MEDICINE CLINIC | Age: 55
End: 2025-01-07
Payer: COMMERCIAL

## 2025-01-07 VITALS
RESPIRATION RATE: 16 BRPM | OXYGEN SATURATION: 97 % | WEIGHT: 199.6 LBS | HEART RATE: 80 BPM | BODY MASS INDEX: 32.08 KG/M2 | DIASTOLIC BLOOD PRESSURE: 78 MMHG | HEIGHT: 66 IN | SYSTOLIC BLOOD PRESSURE: 132 MMHG

## 2025-01-07 DIAGNOSIS — F41.9 ANXIETY: ICD-10-CM

## 2025-01-07 DIAGNOSIS — K58.2 IRRITABLE BOWEL SYNDROME WITH BOTH CONSTIPATION AND DIARRHEA: ICD-10-CM

## 2025-01-07 DIAGNOSIS — R11.0 NAUSEA: ICD-10-CM

## 2025-01-07 DIAGNOSIS — E11.9 TYPE 2 DIABETES MELLITUS WITHOUT COMPLICATION, WITHOUT LONG-TERM CURRENT USE OF INSULIN (HCC): Primary | ICD-10-CM

## 2025-01-07 DIAGNOSIS — B35.1 TOENAIL FUNGUS: ICD-10-CM

## 2025-01-07 DIAGNOSIS — G47.00 INSOMNIA, UNSPECIFIED TYPE: ICD-10-CM

## 2025-01-07 LAB
CREAT UR-MCNC: 163.3 MG/DL
HBA1C MFR BLD: 7.9 %
MICROALBUMIN UR-MCNC: < 1.2 MG/DL
MICROALBUMIN/CREAT RATIO PNL UR: 7 MG/G (ref 0–30)

## 2025-01-07 PROCEDURE — 99214 OFFICE O/P EST MOD 30 MIN: CPT | Performed by: NURSE PRACTITIONER

## 2025-01-07 PROCEDURE — 3078F DIAST BP <80 MM HG: CPT | Performed by: NURSE PRACTITIONER

## 2025-01-07 PROCEDURE — 83036 HEMOGLOBIN GLYCOSYLATED A1C: CPT | Performed by: NURSE PRACTITIONER

## 2025-01-07 PROCEDURE — 3051F HG A1C>EQUAL 7.0%<8.0%: CPT | Performed by: NURSE PRACTITIONER

## 2025-01-07 PROCEDURE — 3075F SYST BP GE 130 - 139MM HG: CPT | Performed by: NURSE PRACTITIONER

## 2025-01-07 RX ORDER — ONDANSETRON 4 MG/1
4 TABLET, FILM COATED ORAL 3 TIMES DAILY PRN
Qty: 90 TABLET | Refills: 3 | Status: SHIPPED | OUTPATIENT
Start: 2025-01-07

## 2025-01-07 RX ORDER — TIRZEPATIDE 10 MG/.5ML
INJECTION, SOLUTION SUBCUTANEOUS
COMMUNITY
Start: 2024-12-30 | End: 2025-01-07

## 2025-01-07 RX ORDER — TRAZODONE HYDROCHLORIDE 100 MG/1
100 TABLET ORAL NIGHTLY PRN
Qty: 90 TABLET | Refills: 3 | Status: SHIPPED | OUTPATIENT
Start: 2025-01-07

## 2025-01-07 RX ORDER — TIRZEPATIDE 12.5 MG/.5ML
12.5 INJECTION, SOLUTION SUBCUTANEOUS WEEKLY
Qty: 6 ML | Refills: 3 | Status: SHIPPED | OUTPATIENT
Start: 2025-01-07

## 2025-01-07 RX ORDER — PHENOL 1.4 %
AEROSOL, SPRAY (ML) MUCOUS MEMBRANE
COMMUNITY

## 2025-01-07 ASSESSMENT — ENCOUNTER SYMPTOMS
EYE ITCHING: 0
EYE PAIN: 0
CONSTIPATION: 0
BACK PAIN: 0
ABDOMINAL PAIN: 0
WHEEZING: 0
VOICE CHANGE: 0
CHOKING: 0
EYE DISCHARGE: 0
COLOR CHANGE: 0
COUGH: 0
SINUS PRESSURE: 0
CHEST TIGHTNESS: 0
SHORTNESS OF BREATH: 0
NAUSEA: 0
ABDOMINAL DISTENTION: 0
VOMITING: 0

## 2025-01-07 ASSESSMENT — PATIENT HEALTH QUESTIONNAIRE - PHQ9
SUM OF ALL RESPONSES TO PHQ QUESTIONS 1-9: 0
SUM OF ALL RESPONSES TO PHQ QUESTIONS 1-9: 0
SUM OF ALL RESPONSES TO PHQ9 QUESTIONS 1 & 2: 0
SUM OF ALL RESPONSES TO PHQ QUESTIONS 1-9: 0
1. LITTLE INTEREST OR PLEASURE IN DOING THINGS: NOT AT ALL
SUM OF ALL RESPONSES TO PHQ QUESTIONS 1-9: 0
2. FEELING DOWN, DEPRESSED OR HOPELESS: NOT AT ALL

## 2025-01-07 ASSESSMENT — ANXIETY QUESTIONNAIRES
7. FEELING AFRAID AS IF SOMETHING AWFUL MIGHT HAPPEN: NOT AT ALL
3. WORRYING TOO MUCH ABOUT DIFFERENT THINGS: NOT AT ALL
1. FEELING NERVOUS, ANXIOUS, OR ON EDGE: MORE THAN HALF THE DAYS
IF YOU CHECKED OFF ANY PROBLEMS ON THIS QUESTIONNAIRE, HOW DIFFICULT HAVE THESE PROBLEMS MADE IT FOR YOU TO DO YOUR WORK, TAKE CARE OF THINGS AT HOME, OR GET ALONG WITH OTHER PEOPLE: SOMEWHAT DIFFICULT
6. BECOMING EASILY ANNOYED OR IRRITABLE: NEARLY EVERY DAY
2. NOT BEING ABLE TO STOP OR CONTROL WORRYING: NOT AT ALL
GAD7 TOTAL SCORE: 9
4. TROUBLE RELAXING: MORE THAN HALF THE DAYS
5. BEING SO RESTLESS THAT IT IS HARD TO SIT STILL: MORE THAN HALF THE DAYS

## 2025-01-07 NOTE — PROGRESS NOTES
[facility]  [facility]  [address] [address]  Dept: [phone]  Loc: [phone]    Rosa Escalante (:  1970) is a 54 y.o. female,Established patient, here for evaluation of the following chief complaint(s):  Follow-up (Patient presents for a 3 month f/u. /Last A1C = 6.3 on 10/04/2024. /Patient mentions that she feels \"bitchy\". She is unsure if the current medications are helping. )      ASSESSMENT/PLAN:  1. Type 2 diabetes mellitus without complication, without long-term current use of insulin ([facility])  Hemoglobin A1C   Date Value Ref Range Status   2025 7.9 % Final     Previously 6.3% In  was 7.8 and at that time mounjaro was increased to 10 mg from 7.5, it then came doen to 6.3% in , but now back up to 7.9%   Increasing to 12.5 mg today   -     POCT glycosylated hemoglobin (Hb A1C)  -     Tirzepatide (MOUNJARO) 12.5 MG/0.5ML SOAJ; Inject 12.5 mg into the skin once a week, Disp-6 mL, R-3Normal  2. Nausea  Takes q hs prn for nausea r/t effexor and mounjaro   -     ondansetron (ZOFRAN) 4 MG tablet; Take 1 tablet by mouth 3 times daily as needed for Nausea or Vomiting, Disp-90 tablet, R-3Normal  3. Anxiety  Doing well. Continue Effexor 75 mg daily.   Patient denies any SI/HI  4. Irritable bowel syndrome with both constipation and diarrhea  Stable. Follows with GI. Denies any blood or mucus in stool   5. Insomnia  Has been using OTC melatonin 30 mg, encouraged to decrease that and when Rx is delivered start resuming Trazodone  mg q hs.     6.Toe Nail fungus   Bilateral great toenails with thick discoloration   Patient to get OTC toenail fungal medication.         Return in about 3 months (around 2025) for DM f/u .    SUBJECTIVE/OBJECTIVE:  Patient presents with:  Follow-up: Patient presents for a 3 month f/u.   Last A1C = 6.3 on 10/04/2024.   Patient mentions that she feels \"bitchy\". She is unsure if the

## 2025-01-07 NOTE — PROGRESS NOTES
Health Maintenance Due   Topic Date Due    Pneumococcal 0-64 years Vaccine (1 of 2 - PCV) Never done    Hepatitis B vaccine (1 of 3 - 19+ 3-dose series) Never done    Shingles vaccine (1 of 2) Never done    Breast cancer screen  08/09/2023    Diabetic retinal exam  05/04/2024    Flu vaccine (1) Never done    COVID-19 Vaccine (3 - 2023-24 season) 09/01/2024    GFR test (Diabetes, CKD 3-4, OR last GFR 15-59)  11/08/2024    Depression Screen  01/26/2025      Depression screen today.     Patient given mammogram order.     [facility] for eye exams.    Patient declines flu vaccine.

## 2025-01-08 ENCOUNTER — PATIENT MESSAGE (OUTPATIENT)
Dept: FAMILY MEDICINE CLINIC | Age: 55
End: 2025-01-08

## 2025-01-08 DIAGNOSIS — R11.0 NAUSEA: ICD-10-CM

## 2025-01-13 RX ORDER — ONDANSETRON 4 MG/1
4 TABLET, FILM COATED ORAL DAILY PRN
Qty: 30 TABLET | Refills: 2 | Status: SHIPPED | OUTPATIENT
Start: 2025-01-13 | End: 2026-01-13

## 2025-01-23 ENCOUNTER — TELEPHONE (OUTPATIENT)
Dept: FAMILY MEDICINE CLINIC | Age: 55
End: 2025-01-23

## 2025-04-08 ENCOUNTER — OFFICE VISIT (OUTPATIENT)
Dept: FAMILY MEDICINE CLINIC | Age: 55
End: 2025-04-08
Payer: COMMERCIAL

## 2025-04-08 VITALS
WEIGHT: 192.2 LBS | TEMPERATURE: 97.9 F | OXYGEN SATURATION: 98 % | RESPIRATION RATE: 16 BRPM | HEIGHT: 66 IN | HEART RATE: 80 BPM | SYSTOLIC BLOOD PRESSURE: 136 MMHG | DIASTOLIC BLOOD PRESSURE: 84 MMHG | BODY MASS INDEX: 30.89 KG/M2

## 2025-04-08 DIAGNOSIS — J01.90 ACUTE BACTERIAL SINUSITIS: ICD-10-CM

## 2025-04-08 DIAGNOSIS — I10 ESSENTIAL HYPERTENSION: ICD-10-CM

## 2025-04-08 DIAGNOSIS — F41.9 ANXIETY: ICD-10-CM

## 2025-04-08 DIAGNOSIS — Z12.11 COLON CANCER SCREENING: ICD-10-CM

## 2025-04-08 DIAGNOSIS — B96.89 ACUTE BACTERIAL SINUSITIS: ICD-10-CM

## 2025-04-08 DIAGNOSIS — E11.9 TYPE 2 DIABETES MELLITUS WITHOUT COMPLICATION, WITHOUT LONG-TERM CURRENT USE OF INSULIN: Primary | ICD-10-CM

## 2025-04-08 LAB
ANION GAP SERPL CALC-SCNC: 12 MEQ/L (ref 8–16)
BASOPHILS ABSOLUTE: 0 THOU/MM3 (ref 0–0.1)
BASOPHILS NFR BLD AUTO: 0.4 %
BUN SERPL-MCNC: 9 MG/DL (ref 8–23)
CALCIUM SERPL-MCNC: 9.5 MG/DL (ref 8.6–10)
CHLORIDE SERPL-SCNC: 103 MEQ/L (ref 98–111)
CO2 SERPL-SCNC: 25 MEQ/L (ref 22–29)
CREAT SERPL-MCNC: 0.9 MG/DL (ref 0.5–0.9)
DEPRECATED RDW RBC AUTO: 39.6 FL (ref 35–45)
EOSINOPHIL NFR BLD AUTO: 3.5 %
EOSINOPHILS ABSOLUTE: 0.2 THOU/MM3 (ref 0–0.4)
ERYTHROCYTE [DISTWIDTH] IN BLOOD BY AUTOMATED COUNT: 13.2 % (ref 11.5–14.5)
GFR SERPL CREATININE-BSD FRML MDRD: 76 ML/MIN/1.73M2
GLUCOSE SERPL-MCNC: 179 MG/DL (ref 74–109)
HBA1C MFR BLD: 6.7 %
HCT VFR BLD AUTO: 40.5 % (ref 37–47)
HGB BLD-MCNC: 13.4 GM/DL (ref 12–16)
IMM GRANULOCYTES # BLD AUTO: 0.01 THOU/MM3 (ref 0–0.07)
IMM GRANULOCYTES NFR BLD AUTO: 0.1 %
LYMPHOCYTES ABSOLUTE: 1.5 THOU/MM3 (ref 1–4.8)
LYMPHOCYTES NFR BLD AUTO: 21.5 %
MCH RBC QN AUTO: 28 PG (ref 26–33)
MCHC RBC AUTO-ENTMCNC: 33.1 GM/DL (ref 32.2–35.5)
MCV RBC AUTO: 84.6 FL (ref 81–99)
MONOCYTES ABSOLUTE: 0.4 THOU/MM3 (ref 0.4–1.3)
MONOCYTES NFR BLD AUTO: 5.7 %
NEUTROPHILS ABSOLUTE: 4.7 THOU/MM3 (ref 1.8–7.7)
NEUTROPHILS NFR BLD AUTO: 68.8 %
NRBC BLD AUTO-RTO: 0 /100 WBC
PLATELET # BLD AUTO: 297 THOU/MM3 (ref 130–400)
PMV BLD AUTO: 10.1 FL (ref 9.4–12.4)
POTASSIUM SERPL-SCNC: 4 MEQ/L (ref 3.5–5.2)
RBC # BLD AUTO: 4.79 MILL/MM3 (ref 4.2–5.4)
SODIUM SERPL-SCNC: 140 MEQ/L (ref 135–145)
WBC # BLD AUTO: 6.8 THOU/MM3 (ref 4.8–10.8)

## 2025-04-08 PROCEDURE — 3044F HG A1C LEVEL LT 7.0%: CPT | Performed by: NURSE PRACTITIONER

## 2025-04-08 PROCEDURE — 36415 COLL VENOUS BLD VENIPUNCTURE: CPT | Performed by: NURSE PRACTITIONER

## 2025-04-08 PROCEDURE — 3079F DIAST BP 80-89 MM HG: CPT | Performed by: NURSE PRACTITIONER

## 2025-04-08 PROCEDURE — 99214 OFFICE O/P EST MOD 30 MIN: CPT | Performed by: NURSE PRACTITIONER

## 2025-04-08 PROCEDURE — 3075F SYST BP GE 130 - 139MM HG: CPT | Performed by: NURSE PRACTITIONER

## 2025-04-08 PROCEDURE — 83036 HEMOGLOBIN GLYCOSYLATED A1C: CPT | Performed by: NURSE PRACTITIONER

## 2025-04-08 RX ORDER — ALPRAZOLAM 0.25 MG
0.25 TABLET ORAL 2 TIMES DAILY PRN
Qty: 30 TABLET | Refills: 0 | Status: SHIPPED | OUTPATIENT
Start: 2025-04-08 | End: 2025-05-08

## 2025-04-08 RX ORDER — AVOBENZONE, HOMOSALATE, OCTISALATE, OCTOCRYLENE 30; 40; 45; 26 MG/ML; MG/ML; MG/ML; MG/ML
1 CREAM TOPICAL 2 TIMES DAILY
Qty: 300 EACH | Refills: 1 | Status: SHIPPED | OUTPATIENT
Start: 2025-04-08

## 2025-04-08 RX ORDER — BUSPIRONE HYDROCHLORIDE 10 MG/1
10 TABLET ORAL 3 TIMES DAILY
Qty: 270 TABLET | Refills: 3 | Status: SHIPPED | OUTPATIENT
Start: 2025-04-08 | End: 2026-04-08

## 2025-04-08 RX ORDER — GLUCOSAMINE HCL/CHONDROITIN SU 500-400 MG
CAPSULE ORAL
Qty: 100 STRIP | Refills: 0 | Status: SHIPPED | OUTPATIENT
Start: 2025-04-08

## 2025-04-08 RX ORDER — BLOOD-GLUCOSE METER
1 KIT MISCELLANEOUS DAILY
Qty: 1 KIT | Refills: 0 | Status: SHIPPED | OUTPATIENT
Start: 2025-04-08

## 2025-04-08 RX ORDER — AMLODIPINE BESYLATE 10 MG/1
10 TABLET ORAL DAILY
Qty: 90 TABLET | Refills: 3 | Status: SHIPPED | OUTPATIENT
Start: 2025-04-08 | End: 2026-04-08

## 2025-04-08 RX ORDER — METOPROLOL TARTRATE 50 MG
50 TABLET ORAL 2 TIMES DAILY
Qty: 180 TABLET | Refills: 3 | Status: SHIPPED | OUTPATIENT
Start: 2025-04-08 | End: 2026-04-08

## 2025-04-08 SDOH — ECONOMIC STABILITY: FOOD INSECURITY: WITHIN THE PAST 12 MONTHS, THE FOOD YOU BOUGHT JUST DIDN'T LAST AND YOU DIDN'T HAVE MONEY TO GET MORE.: NEVER TRUE

## 2025-04-08 SDOH — ECONOMIC STABILITY: FOOD INSECURITY: WITHIN THE PAST 12 MONTHS, YOU WORRIED THAT YOUR FOOD WOULD RUN OUT BEFORE YOU GOT MONEY TO BUY MORE.: NEVER TRUE

## 2025-04-08 ASSESSMENT — ANXIETY QUESTIONNAIRES
7. FEELING AFRAID AS IF SOMETHING AWFUL MIGHT HAPPEN: NOT AT ALL
3. WORRYING TOO MUCH ABOUT DIFFERENT THINGS: NOT AT ALL
6. BECOMING EASILY ANNOYED OR IRRITABLE: NEARLY EVERY DAY
2. NOT BEING ABLE TO STOP OR CONTROL WORRYING: SEVERAL DAYS
5. BEING SO RESTLESS THAT IT IS HARD TO SIT STILL: NOT AT ALL
GAD7 TOTAL SCORE: 5
IF YOU CHECKED OFF ANY PROBLEMS ON THIS QUESTIONNAIRE, HOW DIFFICULT HAVE THESE PROBLEMS MADE IT FOR YOU TO DO YOUR WORK, TAKE CARE OF THINGS AT HOME, OR GET ALONG WITH OTHER PEOPLE: NOT DIFFICULT AT ALL
4. TROUBLE RELAXING: NOT AT ALL
1. FEELING NERVOUS, ANXIOUS, OR ON EDGE: SEVERAL DAYS

## 2025-04-08 ASSESSMENT — ENCOUNTER SYMPTOMS
CHOKING: 0
VOICE CHANGE: 0
EYE PAIN: 0
SHORTNESS OF BREATH: 0
CONSTIPATION: 0
CHEST TIGHTNESS: 0
EYE DISCHARGE: 0
ABDOMINAL PAIN: 0
EYE ITCHING: 0
VOMITING: 0
SINUS PAIN: 1
BACK PAIN: 0
WHEEZING: 0
COLOR CHANGE: 0
NAUSEA: 0
SINUS PRESSURE: 1
COUGH: 1
ABDOMINAL DISTENTION: 0

## 2025-04-08 NOTE — PROGRESS NOTES
[facility]  [address] [address]  Dept: [phone]  Loc: [phone]    Rosa Escalante (:  1970) is a 55 y.o. female,Established patient, here for evaluation of the following chief complaint(s):  Diabetes (Patient presents for a 3 month f/u for DM and Anxiety. /Last A1C =7.9% on 2025. /Patient c/o sinus drainage and a productive cough. /She has tried Mucinex, cough drops and cough syrup. She reports this started before 2025. She had been watching a grandchild that had influenza A in early March. She feels that she is getting better. Denies SOB./Patient would like a new glucometer kit, strips and lancets sent to [employer]. )      ASSESSMENT/PLAN:  1. Type 2 diabetes mellitus without complication, without long-term current use of insulin  Hemoglobin A1C   Date Value Ref Range Status   2025 6.7 % Final     Continue 12.5 mg mounjaro   -     POCT glycosylated hemoglobin (Hb A1C)  -     Lancets MISC  -     blood glucose monitor strips  -     glucose monitoring kit  2. Essential hypertension  Controlled. 136/84  Continue current medications.   Patient reminded and encouraged to make the necessary lifestyle modifications if appropriate: Weight loss, DASH diet, Reduce sodium, Increase physical activity, Moderate Alcohol consumption.   -     amLODIPine (NORVASC) 10 MG tablet  -     metoprolol tartrate (LOPRESSOR) 50 MG tablet  3. Anxiety  Continue Effexor and buspar.   Prn Xanax.  -     busPIRone (BUSPAR) 10 MG tablet  OARRS reviewed and no concerns or red flags noted.   See PDMP below.   Last refill of Xanax was 10/2023  Refill sent in today.   4. Acute bacterial sinusitis   Patient reports URI started over 3 weeks ago, continue to have sinus pressure/headache.   Exam consistent with bacterial sinus. Augmentin sent in.   5. Colon cancer screening   Referral to GI colon cancer screening    Patient

## 2025-04-08 NOTE — PROGRESS NOTES
Health Maintenance Due   Topic Date Due    Hepatitis B vaccine (1 of 3 - 19+ 3-dose series) Never done    Pneumococcal 50+ years Vaccine (1 of 2 - PCV) Never done    Shingles vaccine (1 of 2) Never done    Breast cancer screen  08/09/2023    Diabetic retinal exam  05/04/2024    GFR test (Diabetes, CKD 3-4, OR last GFR 15-59)  11/08/2024      [facility] for [facility] Exam.     Patient plans to schedule mammogram.

## 2025-04-11 ENCOUNTER — RESULTS FOLLOW-UP (OUTPATIENT)
Dept: FAMILY MEDICINE CLINIC | Age: 55
End: 2025-04-11

## 2025-07-24 ENCOUNTER — OFFICE VISIT (OUTPATIENT)
Dept: FAMILY MEDICINE CLINIC | Age: 55
End: 2025-07-24
Payer: COMMERCIAL

## 2025-07-24 VITALS
OXYGEN SATURATION: 96 % | WEIGHT: 194.6 LBS | RESPIRATION RATE: 16 BRPM | SYSTOLIC BLOOD PRESSURE: 132 MMHG | HEART RATE: 96 BPM | DIASTOLIC BLOOD PRESSURE: 76 MMHG | BODY MASS INDEX: 31.42 KG/M2

## 2025-07-24 DIAGNOSIS — F41.9 ANXIETY: ICD-10-CM

## 2025-07-24 DIAGNOSIS — K58.2 IRRITABLE BOWEL SYNDROME WITH BOTH CONSTIPATION AND DIARRHEA: ICD-10-CM

## 2025-07-24 DIAGNOSIS — E11.9 TYPE 2 DIABETES MELLITUS WITHOUT COMPLICATION, WITHOUT LONG-TERM CURRENT USE OF INSULIN (HCC): Primary | ICD-10-CM

## 2025-07-24 DIAGNOSIS — I10 ESSENTIAL HYPERTENSION: ICD-10-CM

## 2025-07-24 LAB — HBA1C MFR BLD: 7.7 %

## 2025-07-24 PROCEDURE — 83036 HEMOGLOBIN GLYCOSYLATED A1C: CPT | Performed by: NURSE PRACTITIONER

## 2025-07-24 PROCEDURE — 3075F SYST BP GE 130 - 139MM HG: CPT | Performed by: NURSE PRACTITIONER

## 2025-07-24 PROCEDURE — 3078F DIAST BP <80 MM HG: CPT | Performed by: NURSE PRACTITIONER

## 2025-07-24 PROCEDURE — 99214 OFFICE O/P EST MOD 30 MIN: CPT | Performed by: NURSE PRACTITIONER

## 2025-07-24 PROCEDURE — 3051F HG A1C>EQUAL 7.0%<8.0%: CPT | Performed by: NURSE PRACTITIONER

## 2025-07-24 RX ORDER — VENLAFAXINE HYDROCHLORIDE 75 MG/1
75 CAPSULE, EXTENDED RELEASE ORAL DAILY
Qty: 90 CAPSULE | Refills: 3 | Status: SHIPPED | OUTPATIENT
Start: 2025-07-24 | End: 2026-07-24

## 2025-07-24 RX ORDER — LOSARTAN POTASSIUM 100 MG/1
100 TABLET ORAL DAILY
Qty: 90 TABLET | Refills: 3 | Status: SHIPPED | OUTPATIENT
Start: 2025-07-24 | End: 2026-07-24

## 2025-07-24 RX ORDER — DICYCLOMINE HYDROCHLORIDE 10 MG/1
10 CAPSULE ORAL
Qty: 120 CAPSULE | Refills: 1 | Status: SHIPPED | OUTPATIENT
Start: 2025-07-24

## 2025-07-29 ENCOUNTER — PATIENT MESSAGE (OUTPATIENT)
Dept: FAMILY MEDICINE CLINIC | Age: 55
End: 2025-07-29